# Patient Record
Sex: FEMALE | Race: ASIAN | Employment: FULL TIME | URBAN - METROPOLITAN AREA
[De-identification: names, ages, dates, MRNs, and addresses within clinical notes are randomized per-mention and may not be internally consistent; named-entity substitution may affect disease eponyms.]

---

## 2017-10-01 ENCOUNTER — TRANSFERRED RECORDS (OUTPATIENT)
Dept: HEALTH INFORMATION MANAGEMENT | Facility: CLINIC | Age: 37
End: 2017-10-01

## 2017-10-01 LAB — PAP SMEAR - HIM PATIENT REPORTED: NEGATIVE

## 2018-08-10 ENCOUNTER — OFFICE VISIT (OUTPATIENT)
Dept: FAMILY MEDICINE | Facility: CLINIC | Age: 38
End: 2018-08-10
Payer: COMMERCIAL

## 2018-08-10 VITALS
TEMPERATURE: 97.9 F | DIASTOLIC BLOOD PRESSURE: 80 MMHG | BODY MASS INDEX: 46.95 KG/M2 | WEIGHT: 265 LBS | HEIGHT: 63 IN | HEART RATE: 82 BPM | SYSTOLIC BLOOD PRESSURE: 120 MMHG

## 2018-08-10 DIAGNOSIS — N91.1 SECONDARY AMENORRHEA: Primary | ICD-10-CM

## 2018-08-10 LAB
ERYTHROCYTE [DISTWIDTH] IN BLOOD BY AUTOMATED COUNT: 14.6 % (ref 10–15)
ESTRADIOL SERPL-MCNC: 154 PG/ML
FSH SERPL-ACNC: 3.7 IU/L
HCT VFR BLD AUTO: 40.8 % (ref 35–47)
HGB BLD-MCNC: 12.4 G/DL (ref 11.7–15.7)
LH SERPL-ACNC: 2.7 IU/L
MCH RBC QN AUTO: 23.8 PG (ref 26.5–33)
MCHC RBC AUTO-ENTMCNC: 30.4 G/DL (ref 31.5–36.5)
MCV RBC AUTO: 78 FL (ref 78–100)
PLATELET # BLD AUTO: 338 10E9/L (ref 150–450)
PROGEST SERPL-MCNC: <0.2 NG/ML
PROLACTIN SERPL-MCNC: 6 UG/L (ref 3–27)
RBC # BLD AUTO: 5.22 10E12/L (ref 3.8–5.2)
WBC # BLD AUTO: 13.4 10E9/L (ref 4–11)

## 2018-08-10 PROCEDURE — 82670 ASSAY OF TOTAL ESTRADIOL: CPT | Performed by: INTERNAL MEDICINE

## 2018-08-10 PROCEDURE — 84403 ASSAY OF TOTAL TESTOSTERONE: CPT | Performed by: INTERNAL MEDICINE

## 2018-08-10 PROCEDURE — 85027 COMPLETE CBC AUTOMATED: CPT | Performed by: INTERNAL MEDICINE

## 2018-08-10 PROCEDURE — 83002 ASSAY OF GONADOTROPIN (LH): CPT | Performed by: INTERNAL MEDICINE

## 2018-08-10 PROCEDURE — 84146 ASSAY OF PROLACTIN: CPT | Performed by: INTERNAL MEDICINE

## 2018-08-10 PROCEDURE — 36415 COLL VENOUS BLD VENIPUNCTURE: CPT | Performed by: INTERNAL MEDICINE

## 2018-08-10 PROCEDURE — 99203 OFFICE O/P NEW LOW 30 MIN: CPT | Performed by: INTERNAL MEDICINE

## 2018-08-10 PROCEDURE — 84144 ASSAY OF PROGESTERONE: CPT | Performed by: INTERNAL MEDICINE

## 2018-08-10 PROCEDURE — 83001 ASSAY OF GONADOTROPIN (FSH): CPT | Performed by: INTERNAL MEDICINE

## 2018-08-10 PROCEDURE — 82627 DEHYDROEPIANDROSTERONE: CPT | Performed by: INTERNAL MEDICINE

## 2018-08-10 NOTE — MR AVS SNAPSHOT
"              After Visit Summary   8/10/2018    Puma Trent    MRN: [id number]           Patient Information     Date Of Birth          1980        Visit Information        Provider Department      8/10/2018 11:00 AM Sharla Bustillo MD [facility]        Today's Diagnoses     Missed period    -  1       Follow-ups after your visit        Follow-up notes from your care team     Return in about 1 week (around 2018) for pending lab results.      Who to contact     If you have questions or need follow up information about today's clinic visit or your schedule please contact [facility] directly at [phone].  Normal or non-critical lab and imaging results will be communicated to you by MyChart, letter or phone within 4 business days after the clinic has received the results. If you do not hear from us within 7 days, please contact the clinic through MyChart or phone. If you have a critical or abnormal lab result, we will notify you by phone as soon as possible.  Submit refill requests through [employer] or call your pharmacy and they will forward the refill request to us. Please allow 3 business days for your refill to be completed.          Additional Information About Your Visit        MyChart Information     Digital [employer] lets you send messages to your doctor, view your test results, renew your prescriptions, schedule appointments and more. To sign up, go to [email or web address] . Click on \"Log in\" on the left side of the screen, which will take you to the Welcome page. Then click on \"Sign up Now\" on the right side of the page.     You will be asked to enter the access code listed below, as well as some personal information. Please follow the directions to create your username and password.     Your access code is: [email or web address]  Expires: 2018 11:43 AM     Your access code will  in 90 days. If you need help or a new code, please call your [place] " clinic or [phone].        Care EveryWhere ID     This is your Care EveryWhere ID. This could be used by other organizations to access your [facility] medical records  [id number]         Blood Pressure from Last 3 Encounters:   No data found for BP    Weight from Last 3 Encounters:   No data found for Wt              We Performed the Following     CBC with platelets     DHEA sulfate     Estradiol     Follicle stimulating hormone     Lutropin     Progesterone     Prolactin     Testosterone, total        Primary Care Provider Office Phone # Fax #    [facility] [phone]        [facility] [address]        Equal Access to Services     KENNY AHUMADA : Hadii aad ku hadasho Soomaali, waaxda luqadaha, qaybta kaalmada adeegyada, leslie gordonin hayediln ann lee . So [facility] [phone].    ATENCIÓN: Si habla español, tiene a staton disposición servicios gratuitos de asistencia lingüística. Llame al [phone].    We comply with applicable federal civil rights laws and [place] laws. We do not discriminate on the basis of race, color, national origin, age, disability, sex, sexual orientation, or gender identity.            Thank you!     Thank you for choosing [facility]  for your care. Our goal is always to provide you with excellent care. Hearing back from our patients is one way we can continue to improve our services. Please take a few minutes to complete the written survey that you may receive in the mail after your visit with us. Thank you!             Your Updated Medication List - Protect others around you: Learn how to safely use, store and throw away your medicines at www.disposemymeds.org.      Notice  As of 8/10/2018 11:43 AM    You have not been prescribed any medications.

## 2018-08-10 NOTE — PROGRESS NOTES
"  SUBJECTIVE:   Puma Trent is a 38 year old female who presents to clinic today for the following health issues:    Amenorrhea.    LMP was about 4 months ago.  Prior to that she was having quite regular periods.  In 2013 she had an ovarian cyst which was removed and had missed a period around that time, but otherwise she has had regular menses.  She has taken pregnancy tests at home which have been negative.  She is not on any hormonal birth control, nor has she been on any recently.  Is on no prescription medications. Besides her weight she is quite healthy.  She denies headaches, vision, breast discharge, changes in skin.  He had her thyroid checked about 9 months ago at a yearly physical in [facility].    Moved to [place] from [facility] Nov 2017.  She works in [employer] for [employer].  Lives with  and kids.         Reviewed and updated as needed this visit by clinical staff  Allergies  Meds       Reviewed and updated as needed this visit by Provider  Meds         ROS:  Const, endo, gu reviewed,  otherwise negative unless noted above.       OBJECTIVE:     /80 (BP Location: Right arm, Patient Position: Chair, Cuff Size: Adult Large)  Pulse 82  Temp 97.9  F (36.6  C) (Tympanic)  Ht 5' 3\" (1.6 m)  Wt 265 lb (120.2 kg)  BMI 46.94 kg/m2  Body mass index is 46.94 kg/(m^2).    Gen: well appearing, pleasant obese woman, no distress  HEENT: PERRL, sclera nonicteric, MMM  Neck: supple, no LAD, no appreciable thyromegaly   Pulm: breathing comfortably, CTAB, no wheezes or rales  CV: RRR, normal S1 and S2, no murmurs  Abd: BS present, soft, nontender, nondistended        ASSESSMENT/PLAN:       1. Secondary amenorrhea  Strange to have no periods for 4 months when previously was having very regular periods.  Will check hormone levels today.  If unrevealing, consider pelvic ultrasound. TSH done annually in [facility] and has been normal, decided not to repeat today   - Prolactin  - Lutropin  - Follicle " stimulating hormone  - DHEA sulfate  - Testosterone, total  - Estradiol  - CBC with platelets  - Progesterone    2. BMI 45.0-49.9, adult (H)  Reports having testing for blood sugar in [place] and has always been normal.       F/U - pending lab results       Sharla Bustillo MD  [facility]

## 2018-08-13 LAB — DHEA-S SERPL-MCNC: 70 UG/DL (ref 35–430)

## 2018-08-14 ENCOUNTER — TELEPHONE (OUTPATIENT)
Dept: FAMILY MEDICINE | Facility: CLINIC | Age: 38
End: 2018-08-14

## 2018-08-14 DIAGNOSIS — D72.829 LEUKOCYTOSIS, UNSPECIFIED TYPE: ICD-10-CM

## 2018-08-14 DIAGNOSIS — N91.1 SECONDARY AMENORRHEA: Primary | ICD-10-CM

## 2018-08-14 LAB — TESTOST SERPL-MCNC: 20 NG/DL (ref 8–60)

## 2018-08-14 NOTE — TELEPHONE ENCOUNTER
Attempt to reach patient x 3.  Phone rings and then disconnects.  Will need to try again later.  Grazyna Garland RN - Triage  [facility]

## 2018-08-15 NOTE — TELEPHONE ENCOUNTER
Called number listed and phone disconnects without ringing. Sent P[employer]Noise message to patient to call clinic.    Elidia CrenshawRN BSN  [facility]  [phone]

## 2018-09-10 DIAGNOSIS — D72.829 LEUKOCYTOSIS, UNSPECIFIED TYPE: ICD-10-CM

## 2018-09-10 LAB
BASOPHILS # BLD AUTO: 0 10E9/L (ref 0–0.2)
BASOPHILS NFR BLD AUTO: 0.4 %
DIFFERENTIAL METHOD BLD: ABNORMAL
EOSINOPHIL # BLD AUTO: 0.2 10E9/L (ref 0–0.7)
EOSINOPHIL NFR BLD AUTO: 2 %
ERYTHROCYTE [DISTWIDTH] IN BLOOD BY AUTOMATED COUNT: 14.7 % (ref 10–15)
HCT VFR BLD AUTO: 37.2 % (ref 35–47)
HGB BLD-MCNC: 11.4 G/DL (ref 11.7–15.7)
LYMPHOCYTES # BLD AUTO: 3.3 10E9/L (ref 0.8–5.3)
LYMPHOCYTES NFR BLD AUTO: 30 %
MCH RBC QN AUTO: 24.1 PG (ref 26.5–33)
MCHC RBC AUTO-ENTMCNC: 30.6 G/DL (ref 31.5–36.5)
MCV RBC AUTO: 79 FL (ref 78–100)
MONOCYTES # BLD AUTO: 0.8 10E9/L (ref 0–1.3)
MONOCYTES NFR BLD AUTO: 6.7 %
NEUTROPHILS # BLD AUTO: 6.8 10E9/L (ref 1.6–8.3)
NEUTROPHILS NFR BLD AUTO: 60.9 %
PLATELET # BLD AUTO: 323 10E9/L (ref 150–450)
RBC # BLD AUTO: 4.73 10E12/L (ref 3.8–5.2)
WBC # BLD AUTO: 11.1 10E9/L (ref 4–11)

## 2018-09-10 PROCEDURE — 36415 COLL VENOUS BLD VENIPUNCTURE: CPT | Performed by: INTERNAL MEDICINE

## 2018-09-10 PROCEDURE — 85025 COMPLETE CBC W/AUTO DIFF WBC: CPT | Performed by: INTERNAL MEDICINE

## 2018-11-08 ENCOUNTER — OFFICE VISIT (OUTPATIENT)
Dept: FAMILY MEDICINE | Facility: CLINIC | Age: 38
End: 2018-11-08
Payer: COMMERCIAL

## 2018-11-08 VITALS
DIASTOLIC BLOOD PRESSURE: 70 MMHG | WEIGHT: 268 LBS | SYSTOLIC BLOOD PRESSURE: 112 MMHG | TEMPERATURE: 98.3 F | HEART RATE: 92 BPM | BODY MASS INDEX: 47.47 KG/M2

## 2018-11-08 DIAGNOSIS — Z11.4 SCREENING FOR HIV (HUMAN IMMUNODEFICIENCY VIRUS): Primary | ICD-10-CM

## 2018-11-08 DIAGNOSIS — N92.1 MENORRHAGIA WITH IRREGULAR CYCLE: ICD-10-CM

## 2018-11-08 DIAGNOSIS — E66.01 MORBID OBESITY (H): ICD-10-CM

## 2018-11-08 DIAGNOSIS — N92.6 ABNORMAL MENSTRUAL PERIODS: ICD-10-CM

## 2018-11-08 LAB
ERYTHROCYTE [DISTWIDTH] IN BLOOD BY AUTOMATED COUNT: 14.6 % (ref 10–15)
HCT VFR BLD AUTO: 38.2 % (ref 35–47)
HGB BLD-MCNC: 11.8 G/DL (ref 11.7–15.7)
MCH RBC QN AUTO: 23.9 PG (ref 26.5–33)
MCHC RBC AUTO-ENTMCNC: 30.9 G/DL (ref 31.5–36.5)
MCV RBC AUTO: 78 FL (ref 78–100)
PLATELET # BLD AUTO: 368 10E9/L (ref 150–450)
RBC # BLD AUTO: 4.93 10E12/L (ref 3.8–5.2)
WBC # BLD AUTO: 11.9 10E9/L (ref 4–11)

## 2018-11-08 PROCEDURE — 99214 OFFICE O/P EST MOD 30 MIN: CPT | Performed by: FAMILY MEDICINE

## 2018-11-08 PROCEDURE — 84443 ASSAY THYROID STIM HORMONE: CPT | Performed by: FAMILY MEDICINE

## 2018-11-08 PROCEDURE — 36415 COLL VENOUS BLD VENIPUNCTURE: CPT | Performed by: FAMILY MEDICINE

## 2018-11-08 PROCEDURE — 85027 COMPLETE CBC AUTOMATED: CPT | Performed by: FAMILY MEDICINE

## 2018-11-08 NOTE — MR AVS SNAPSHOT
After Visit Summary   11/8/2018    Puma Trent    MRN: [id number]           Patient Information     Date Of Birth          1980        Visit Information        Provider Department      11/8/2018 1:40 PM [facility]ron MD [facility]        Today's Diagnoses     Screening for HIV (human immunodeficiency virus)    -  1    Morbid obesity (H)        Abnormal menstrual periods        Menorrhagia with irregular cycle           Follow-ups after your visit        Follow-up notes from your care team     Return in about 4 weeks (around 12/6/2018) for if symptom does not get better.      Future tests that were ordered for you today     Open Future Orders        Priority Expected Expires Ordered    US Pelvic Complete w Transvaginal Routine  11/8/2019 11/8/2018            Who to contact     If you have questions or need follow up information about today's clinic visit or your schedule please contact [facility] directly at [phone].  Normal or non-critical lab and imaging results will be communicated to you by MyChart, letter or phone within 4 business days after the clinic has received the results. If you do not hear from us within 7 days, please contact the clinic through [employer] or phone. If you have a critical or abnormal lab result, we will notify you by phone as soon as possible.  Submit refill requests through [employer] or call your pharmacy and they will forward the refill request to us. Please allow 3 business days for your refill to be completed.          Additional Information About Your Visit        MyChart Information     [employer] gives you secure access to your electronic health record. If you see a primary care provider, you can also send messages to your care team and make appointments. If you have questions, please call your primary care clinic.  If you do not have a primary care provider, please call [phone] and they will assist you.        Care  EveryWhere ID     This is your Care EveryWhere ID. This could be used by other organizations to access your [facility] medical records  [id number]        Your Vitals Were     Pulse Temperature Last Period BMI (Body Mass Index)          92 98.3  F (36.8  C) (Tympanic) 11/04/2018 47.47 kg/m2         Blood Pressure from Last 3 Encounters:   11/08/18 112/70   08/10/18 120/80    Weight from Last 3 Encounters:   11/08/18 268 lb (121.6 kg)   08/10/18 265 lb (120.2 kg)              We Performed the Following     CBC with platelets     TSH        Primary Care Provider Office Phone # Fax #    Sharla Bustillo [phone]        [facility] [address]        Equal Access to Services     [facility] : Hadii aad ku hadasho Soomaali, waaxda luqadaha, qaybta kaalmada adesilviayagreg, leslie lee . So [facility] [phone].    ATENCIÓN: Si habla español, tiene a staton disposición servicios gratuitos de asistencia lingüística. Chuck al [phone].    We comply with applicable federal civil rights laws and [place] laws. We do not discriminate on the basis of race, color, national origin, age, disability, sex, sexual orientation, or gender identity.            Thank you!     Thank you for choosing [facility]  for your care. Our goal is always to provide you with excellent care. Hearing back from our patients is one way we can continue to improve our services. Please take a few minutes to complete the written survey that you may receive in the mail after your visit with us. Thank you!             Your Updated Medication List - Protect others around you: Learn how to safely use, store and throw away your medicines at www.disposemymeds.org.      Notice  As of 11/8/2018  2:24 PM    You have not been prescribed any medications.

## 2018-11-08 NOTE — PROGRESS NOTES
SUBJECTIVE:   Puma Trent is a 38 year old female who presents to clinic today for the following health issues:      Vaginal Bleeding (Dysmenorrhea)  Onset: lmp 4 days ago    Description:   Duration of bleeding episodes: 2 months  Frequency between periods:  Varies   Describe bleeding/flow:   Clots: YES  Number of pads/hour: 1 pad per 1-2 hours, soaked   Cramping: moderate    Accompanying Signs & Symptoms:  Weakness: YES  Lightheadedness: no     History:  Patient's last menstrual period was 11/04/2018.  Previous normal periods: no   Contraceptive use: NO  Possibility of Pregnancy: no   Abnormal PAP Smears: no    Therapies Tried and outcome: NONE  Patient came today to discuss irregular and heavy.'s.  Apparently she is morbidly obese.  She had her menstrual period started at normal age.  She always have regular periods  despite being overweight.  For the last 6 months her menstrual cycle is not regular.  She had one period 2 months ago which was very heavy.  After that she did not have any further menstrual bleeding.  3 days ago she again started her period.  She had extensive blood work done which was normal in terms of hormones.  Denies any abdominal discomfort.    Problem list and histories reviewed & adjusted, as indicated.  Additional history: as documented      Reviewed and updated as needed this visit by clinical staff  Tobacco  Allergies  Meds  Soc Hx      Reviewed and updated as needed this visit by Provider         ROS:  CONSTITUTIONAL: NEGATIVE for fever, chills, change in weight  ENT/MOUTH: NEGATIVE for ear, mouth and throat problems  RESP: NEGATIVE for significant cough or SOB  CV: NEGATIVE for chest pain, palpitations or peripheral edema    OBJECTIVE:                                                    /70  Pulse 92  Temp 98.3  F (36.8  C) (Tympanic)  Wt 268 lb (121.6 kg)  LMP 11/04/2018  BMI 47.47 kg/m2  Body mass index is 47.47 kg/(m^2).   GENERAL: healthy, alert, well nourished,  well hydrated, no distress  NECK: no tenderness, no adenopathy, no asymmetry, no masses, no stiffness; thyroid- normal to palpation  RESP: lungs clear to auscultation - no rales, no rhonchi, no wheezes  CV: regular rates and rhythm, normal S1 S2, no S3 or S4 and no murmur, no click or rub -  ABDOMEN: soft, no tenderness, no  hepatosplenomegaly, no masses, normal bowel sounds         ASSESSMENT/PLAN:                                                        ICD-10-CM    1. Screening for HIV (human immunodeficiency virus) Z11.4    2. Morbid obesity (H) E66.01    3. Abnormal menstrual periods N92.6 US Pelvic Complete w Transvaginal     TSH     CBC with platelets   4. Menorrhagia with irregular cycle N92.1 US Pelvic Complete w Transvaginal     TSH     CBC with platelets     Has abnormal menstrual with menorrhagia with irregular cycles.  She has blood work done which was normal in terms of hormones.  I would suggest getting an ultrasound of pelvis to get further evaluation.  Once ultrasound is done we will follow-up on that.  Meanwhile I will get thyroid and complete blood count to make sure they are normal.  We discussed long-term if her periods are not regular and there continue to be heavy, at times birth control OCP might be beneficial.  Other etiologies were discussed with the patient.  Sometimes underlying polycystic ovarian disease  can cause irregular periods.  We will follow-up with the patient once ultrasound report is reviewed.  Warning signs were discussed with the patient    Nba Barron MD  [facility]

## 2018-11-09 LAB — TSH SERPL DL<=0.005 MIU/L-ACNC: 1.24 MU/L (ref 0.4–4)

## 2019-10-11 ENCOUNTER — HOSPITAL ENCOUNTER (OUTPATIENT)
Dept: ULTRASOUND IMAGING | Facility: CLINIC | Age: 39
Discharge: HOME | End: 2019-10-11
Attending: FAMILY MEDICINE | Admitting: FAMILY MEDICINE
Payer: COMMERCIAL

## 2019-10-11 DIAGNOSIS — N92.6 ABNORMAL MENSTRUAL PERIODS: ICD-10-CM

## 2019-10-11 DIAGNOSIS — N92.1 MENORRHAGIA WITH IRREGULAR CYCLE: ICD-10-CM

## 2019-10-11 PROCEDURE — 76830 TRANSVAGINAL US NON-OB: CPT

## 2019-10-14 ENCOUNTER — OFFICE VISIT (OUTPATIENT)
Dept: FAMILY MEDICINE | Facility: CLINIC | Age: 39
End: 2019-10-14
Payer: COMMERCIAL

## 2019-10-14 ENCOUNTER — E-VISIT (OUTPATIENT)
Dept: FAMILY MEDICINE | Facility: CLINIC | Age: 39
End: 2019-10-14

## 2019-10-14 VITALS
HEIGHT: 63 IN | HEART RATE: 116 BPM | TEMPERATURE: 98.3 F | SYSTOLIC BLOOD PRESSURE: 118 MMHG | BODY MASS INDEX: 48.9 KG/M2 | WEIGHT: 276 LBS | OXYGEN SATURATION: 99 % | DIASTOLIC BLOOD PRESSURE: 70 MMHG

## 2019-10-14 DIAGNOSIS — N92.1 MENORRHAGIA WITH IRREGULAR CYCLE: Primary | ICD-10-CM

## 2019-10-14 DIAGNOSIS — N89.8 VAGINAL DISCHARGE: Primary | ICD-10-CM

## 2019-10-14 LAB
ERYTHROCYTE [DISTWIDTH] IN BLOOD BY AUTOMATED COUNT: 16.3 % (ref 10–15)
HCT VFR BLD AUTO: 37.3 % (ref 35–47)
HGB BLD-MCNC: 11.2 G/DL (ref 11.7–15.7)
MCH RBC QN AUTO: 23.5 PG (ref 26.5–33)
MCHC RBC AUTO-ENTMCNC: 30 G/DL (ref 31.5–36.5)
MCV RBC AUTO: 78 FL (ref 78–100)
PLATELET # BLD AUTO: 366 10E9/L (ref 150–450)
RBC # BLD AUTO: 4.77 10E12/L (ref 3.8–5.2)
WBC # BLD AUTO: 10.3 10E9/L (ref 4–11)

## 2019-10-14 PROCEDURE — 36415 COLL VENOUS BLD VENIPUNCTURE: CPT | Performed by: FAMILY MEDICINE

## 2019-10-14 PROCEDURE — 99214 OFFICE O/P EST MOD 30 MIN: CPT | Performed by: FAMILY MEDICINE

## 2019-10-14 PROCEDURE — 83550 IRON BINDING TEST: CPT | Performed by: FAMILY MEDICINE

## 2019-10-14 PROCEDURE — 85027 COMPLETE CBC AUTOMATED: CPT | Performed by: FAMILY MEDICINE

## 2019-10-14 PROCEDURE — 83540 ASSAY OF IRON: CPT | Performed by: FAMILY MEDICINE

## 2019-10-14 PROCEDURE — 80048 BASIC METABOLIC PNL TOTAL CA: CPT | Performed by: FAMILY MEDICINE

## 2019-10-14 PROCEDURE — 82728 ASSAY OF FERRITIN: CPT | Performed by: FAMILY MEDICINE

## 2019-10-14 PROCEDURE — 84443 ASSAY THYROID STIM HORMONE: CPT | Performed by: FAMILY MEDICINE

## 2019-10-14 ASSESSMENT — MIFFLIN-ST. JEOR: SCORE: 1891.06

## 2019-10-14 NOTE — PROGRESS NOTES
Subjective     Puma Trent is a 39 year old female who presents to clinic today for the following health issues:    HPI     Patient comes in to follow-up on pelvic ultrasound that was recommended about a year ago.  She was experiencing heavy periods at that time.  She never got the ultrasound done as the cycles improved.  She tells that in the last few months she has noticed heavy vaginal bleeding again.  She is currently having heavy bleeding for the last few days.  Reports of abdominal cramping.  Pelvic ultrasound result reviewed.  It shows a necrotic submucosal fibroid  Versus thick-walled myometrial cyst in the posterior uterine body.  Marked thickening of the endometrial lining noted.  Results discussed with the patient in detail.      Patient Active Problem List   Diagnosis     BMI 45.0-49.9, adult (H)     Past Surgical History:   Procedure Laterality Date      SECTION  2008     LAPAROSCOPIC CYSTECTOMY OVARIAN (BENIGN)  2013       Social History     Tobacco Use     Smoking status: Never Smoker     Smokeless tobacco: Never Used   Substance Use Topics     Alcohol use: No     Family History   Problem Relation Age of Onset     Hyperlipidemia Mother      Hypertension Mother      No Known Problems Father      No Known Problems Sister      No Known Problems Brother      No Known Problems Maternal Grandmother      No Known Problems Maternal Grandfather      No Known Problems Paternal Grandmother      No Known Problems Other          Current Outpatient Medications   Medication Sig Dispense Refill     ferrous sulfate (FEROSUL) 325 (65 Fe) MG tablet Take 325 mg by mouth daily (with breakfast)       Allergies   Allergen Reactions     Sulfa Drugs Hives     Fainting and drop in blood pressure          Reviewed and updated as needed this visit by Provider         Review of Systems   ROS COMP: CONSTITUTIONAL: NEGATIVE for fever, chills, change in weight  ENT/MOUTH: NEGATIVE for ear, mouth and throat  "problems  RESP: NEGATIVE for significant cough or SOB  CV: NEGATIVE for chest pain, palpitations or peripheral edema      Objective    /70   Pulse 116   Temp 98.3  F (36.8  C) (Tympanic)   Ht 1.6 m (5' 3\")   Wt 125.2 kg (276 lb)   LMP 09/29/2019 (Exact Date)   SpO2 99%   BMI 48.89 kg/m    Body mass index is 48.89 kg/m .  Physical Exam   GENERAL: healthy, alert and no distress  NECK: no adenopathy, no asymmetry, masses, or scars and thyroid normal to palpation  RESP: lungs clear to auscultation - no rales, rhonchi or wheezes  CV: regular rate and rhythm, normal S1 S2, no S3 or S4, no murmur, click or rub, no peripheral edema and peripheral pulses strong  ABDOMEN: soft, nontender, no hepatosplenomegaly, no masses and bowel sounds normal  MS: no gross musculoskeletal defects noted, no edema          Results for orders placed or performed in visit on 10/14/19   CBC with platelets   Result Value Ref Range    WBC 10.3 4.0 - 11.0 10e9/L    RBC Count 4.77 3.8 - 5.2 10e12/L    Hemoglobin 11.2 (L) 11.7 - 15.7 g/dL    Hematocrit 37.3 35.0 - 47.0 %    MCV 78 78 - 100 fl    MCH 23.5 (L) 26.5 - 33.0 pg    MCHC 30.0 (L) 31.5 - 36.5 g/dL    RDW 16.3 (H) 10.0 - 15.0 %    Platelet Count 366 150 - 450 10e9/L   Ferritin   Result Value Ref Range    Ferritin 13 12 - 150 ng/mL   Iron and iron binding capacity   Result Value Ref Range    Iron 20 (L) 35 - 180 ug/dL    Iron Binding Cap 349 240 - 430 ug/dL    Iron Saturation Index 6 (L) 15 - 46 %   Basic metabolic panel   Result Value Ref Range    Sodium 140 133 - 144 mmol/L    Potassium 4.2 3.4 - 5.3 mmol/L    Chloride 107 94 - 109 mmol/L    Carbon Dioxide 24 20 - 32 mmol/L    Anion Gap 9 3 - 14 mmol/L    Glucose 144 (H) 70 - 99 mg/dL    Urea Nitrogen 6 (L) 7 - 30 mg/dL    Creatinine 0.43 (L) 0.52 - 1.04 mg/dL    GFR Estimate >90 >60 mL/min/[1.73_m2]    GFR Estimate If Black >90 >60 mL/min/[1.73_m2]    Calcium 8.2 (L) 8.5 - 10.1 mg/dL   TSH   Result Value Ref Range    TSH 1.28 " "0.40 - 4.00 mU/L       Assessment & Plan     1. Menorrhagia with irregular cycle  Blood work shows borderline low hemoglobin with low iron levels.  Recommending to start iron supplementation.  Recheck labs in 1 to 2 months.  Patient is experiencing heavy vaginal bleeding today due to which endometrial biopsy cannot be performed.  She is instructed to call us as soon as the vaginal bleeding decreases so elevated biopsy could be obtained.  She is given a referral to OB/GYN, to contact them for the procedure as I am going to be out of office in the next few days.  Patient verbalized understanding and agreement to the plan.  - CBC with platelets  - Ferritin  - Iron and iron binding capacity  - Basic metabolic panel  - TSH  - OB/GYN REFERRAL     BMI:   Estimated body mass index is 48.89 kg/m  as calculated from the following:    Height as of this encounter: 1.6 m (5' 3\").    Weight as of this encounter: 125.2 kg (276 lb).   Weight management plan: Discussed healthy diet and exercise guidelines        Daniella Chapman MD  [facility]      "

## 2019-10-15 LAB
ANION GAP SERPL CALCULATED.3IONS-SCNC: 9 MMOL/L (ref 3–14)
BUN SERPL-MCNC: 6 MG/DL (ref 7–30)
CALCIUM SERPL-MCNC: 8.2 MG/DL (ref 8.5–10.1)
CHLORIDE SERPL-SCNC: 107 MMOL/L (ref 94–109)
CO2 SERPL-SCNC: 24 MMOL/L (ref 20–32)
CREAT SERPL-MCNC: 0.43 MG/DL (ref 0.52–1.04)
FERRITIN SERPL-MCNC: 13 NG/ML (ref 12–150)
GFR SERPL CREATININE-BSD FRML MDRD: >90 ML/MIN/{1.73_M2}
GLUCOSE SERPL-MCNC: 144 MG/DL (ref 70–99)
IRON SATN MFR SERPL: 6 % (ref 15–46)
IRON SERPL-MCNC: 20 UG/DL (ref 35–180)
POTASSIUM SERPL-SCNC: 4.2 MMOL/L (ref 3.4–5.3)
SODIUM SERPL-SCNC: 140 MMOL/L (ref 133–144)
TIBC SERPL-MCNC: 349 UG/DL (ref 240–430)
TSH SERPL DL<=0.005 MIU/L-ACNC: 1.28 MU/L (ref 0.4–4)

## 2019-10-22 ENCOUNTER — OFFICE VISIT (OUTPATIENT)
Dept: OBGYN | Facility: CLINIC | Age: 39
End: 2019-10-22
Payer: COMMERCIAL

## 2019-10-22 VITALS — SYSTOLIC BLOOD PRESSURE: 122 MMHG | WEIGHT: 276 LBS | BODY MASS INDEX: 48.89 KG/M2 | DIASTOLIC BLOOD PRESSURE: 76 MMHG

## 2019-10-22 DIAGNOSIS — Z12.4 SCREENING FOR CERVICAL CANCER: Primary | ICD-10-CM

## 2019-10-22 DIAGNOSIS — D25.0 FIBROIDS, SUBMUCOSAL: ICD-10-CM

## 2019-10-22 DIAGNOSIS — N93.8 DUB (DYSFUNCTIONAL UTERINE BLEEDING): ICD-10-CM

## 2019-10-22 PROCEDURE — 87624 HPV HI-RISK TYP POOLED RSLT: CPT | Performed by: OBSTETRICS & GYNECOLOGY

## 2019-10-22 PROCEDURE — 88305 TISSUE EXAM BY PATHOLOGIST: CPT | Performed by: OBSTETRICS & GYNECOLOGY

## 2019-10-22 PROCEDURE — G0145 SCR C/V CYTO,THINLAYER,RESCR: HCPCS | Performed by: OBSTETRICS & GYNECOLOGY

## 2019-10-22 PROCEDURE — 88305 TISSUE EXAM BY PATHOLOGIST: CPT | Mod: 26 | Performed by: OBSTETRICS & GYNECOLOGY

## 2019-10-22 PROCEDURE — 58100 BIOPSY OF UTERUS LINING: CPT | Performed by: OBSTETRICS & GYNECOLOGY

## 2019-10-22 PROCEDURE — 99203 OFFICE O/P NEW LOW 30 MIN: CPT | Mod: 25 | Performed by: OBSTETRICS & GYNECOLOGY

## 2019-10-22 RX ORDER — FERROUS SULFATE 325(65) MG
325 TABLET ORAL
COMMUNITY
End: 2019-12-26

## 2019-10-22 ASSESSMENT — ANXIETY QUESTIONNAIRES
GAD7 TOTAL SCORE: 3
7. FEELING AFRAID AS IF SOMETHING AWFUL MIGHT HAPPEN: NOT AT ALL
6. BECOMING EASILY ANNOYED OR IRRITABLE: MORE THAN HALF THE DAYS
IF YOU CHECKED OFF ANY PROBLEMS ON THIS QUESTIONNAIRE, HOW DIFFICULT HAVE THESE PROBLEMS MADE IT FOR YOU TO DO YOUR WORK, TAKE CARE OF THINGS AT HOME, OR GET ALONG WITH OTHER PEOPLE: NOT DIFFICULT AT ALL
5. BEING SO RESTLESS THAT IT IS HARD TO SIT STILL: NOT AT ALL
1. FEELING NERVOUS, ANXIOUS, OR ON EDGE: NOT AT ALL
3. WORRYING TOO MUCH ABOUT DIFFERENT THINGS: SEVERAL DAYS
2. NOT BEING ABLE TO STOP OR CONTROL WORRYING: NOT AT ALL

## 2019-10-22 ASSESSMENT — PATIENT HEALTH QUESTIONNAIRE - PHQ9
SUM OF ALL RESPONSES TO PHQ QUESTIONS 1-9: 5
5. POOR APPETITE OR OVEREATING: NOT AT ALL

## 2019-10-22 NOTE — PROGRESS NOTES
SUBJECTIVE:                                                   Puma Trent is a 39 year old female who presents to clinic today for the following health issue(s):  Patient presents with:  Abnormal Uterine Bleeding: possible EMB    HPI:  Patient referred due to abnormal uterine bleeding  Patient had one c section  Morbid obesity, wt 276  Saw her pcp for abnormal bleeding in 2018.   She had a pelvic us this month showing thick uterine lining and a possible submucous fibroid versus myometrial cyst  Recent hgb 11.2   TSH normal  Prior laproscopic ovarian cystectomy years ago  One child, not using contraception  Patient does not have diabetes, no family history of gyn cancer or diabetes  Patient had one prolonged bleeding spell a years ago, then went 5 months with no period, then had a couple normal periods and in recent weeks had heavy prolonged bleeding with clots  Has mild anemia noted recently  Not on any hormones  No history of abnormal paps      Patient's last menstrual period was 2019 (exact date)..     Patient is sexually active, No obstetric history on file..  Using none for contraception.    reports that she has never smoked. She has never used smokeless tobacco.    STD testing offered?  Declined    Health maintenance updated:  yes    Today's PHQ-2 Score:   PHQ-2 (  [employer]) 10/14/2019   Q1: Little interest or pleasure in doing things 0   Q2: Feeling down, depressed or hopeless 0   PHQ-2 Score 0     Today's PHQ-9 Score:   PHQ-9 SCORE 10/22/2019   PHQ-9 Total Score 5     Today's LINN-7 Score:   LINN-7 SCORE 10/22/2019   Total Score 3       Problem list and histories reviewed & adjusted, as indicated.  Additional history: as documented.    Patient Active Problem List   Diagnosis     BMI 45.0-49.9, adult (H)     Past Surgical History:   Procedure Laterality Date      SECTION  2008     LAPAROSCOPIC CYSTECTOMY OVARIAN (BENIGN)  2013      Social History     Tobacco Use     Smoking  status: Never Smoker     Smokeless tobacco: Never Used   Substance Use Topics     Alcohol use: No      Problem (# of Occurrences) Relation (Name,Age of Onset)    Hyperlipidemia (1) Mother    Hypertension (1) Mother    No Known Problems (7) Father, Sister, Brother, Maternal Grandmother, Maternal Grandfather, Paternal Grandmother, Other            Current Outpatient Medications   Medication Sig     ferrous sulfate (FEROSUL) 325 (65 Fe) MG tablet Take 325 mg by mouth daily (with breakfast)     No current facility-administered medications for this visit.      Allergies   Allergen Reactions     Sulfa Drugs Hives     Fainting and drop in blood pressure        ROS:  12 point review of systems negative other than symptoms noted below.  Genitourinary: Irregular Menses    OBJECTIVE:     /76   Wt 125.2 kg (276 lb)   LMP 09/29/2019 (Exact Date)   Breastfeeding? No   BMI 48.89 kg/m    Body mass index is 48.89 kg/m .    Exam:  Constitutional:  Appearance: Well nourished, well developed alert, in no acute distress  Chest:  Respiratory Effort:  Breathing unlabored. Clear to auscultation bilaterally.   Psychiatric:  Mentation appears normal and affect normal/bright.  Pelvic Exam:  External Genitalia:     Normal appearance for age, no discharge present, no tenderness present, no inflammatory lesions present, color normal  Vagina:     Normal vaginal vault without central or paravaginal defects, no discharge present, no inflammatory lesions present, no masses present  Bladder:     Nontender to palpation  Urethra:   Urethral Body:  Urethra palpation normal, urethra structural support normal   Urethral Meatus:  No erythema or lesions present  Cervix:     Appearance healthy, no lesions present, nontender to palpation, no bleeding present  Uterus:     Uterus: firm, normal sized and nontender, midplane in position.   Adnexa:     No adnexal tenderness present, no adnexal masses present  Perineum:     Perineum within normal limits,  no evidence of trauma, no rashes or skin lesions present  Anus:     Anus within normal limits, no hemorrhoids present  Inguinal Lymph Nodes:     No lymphadenopathy present  Pubic Hair:     Normal pubic hair distribution for age  Genitalia and Groin:     No rashes present, no lesions present, no areas of discoloration, no masses present       In-Clinic Test Results:  No results found for this or any previous visit (from the past 24 hour(s)).    ASSESSMENT/PLAN:                                                        ICD-10-CM    1. Screening for cervical cancer Z12.4 Pap imaged thin layer screen with HPV - recommended age 30 - 65     HPV High Risk Types DNA Cervical   2. DUB (dysfunctional uterine bleeding) N93.8 Surgical pathology exam     ENDOMETRIAL BIOPSY W/O CERVICAL DILATION   3. Fibroids, submucosal D25.0        Recent ultrasound showed 1cm possible submucous fibroid which appeared necrotic to radiology on ultrasound and she has very thick lining    We did pap and hpv today, and emb  Patient has poor pain tolerance so office procedures would be difficult for her  If path is benign, we will need to schedule D & C, hysteroscopy, myosure to check her lining better  Her obesity increases risk for uterine cancer and hyperplasia  Uterine cavity measured 10 cm in depth today  Could consider placing mirena IUD in OR after D & C  Ablation would be an option   We will call her when path comes back  Discussed that losing weight would be better for her health and reduce risks of uterine cancer  Additional 20 minutes spent today discussing her history, and concerns regarding DYSFUNCTIONAL UTERINE BLEEDING and fibroids, beyond time need to do procedure today, more than 50% of visit  Exams are very difficult for her    Schedule f/u appt with me next week to discuss next steps after we get path back  Likely will need D & C, hysteroscopy and myosure at minimum    Camelia Darby MD  [facility]OMEN  [facility]  INDICATIONS:                                                    Is a pregnancy test required: No.  Was a consent obtained?  Yes    Having endometrial biopsy for menorrhagia    Today's PHQ-2 Score:   PHQ-2 ( 1999 [employer]) 10/14/2019   Q1: Little interest or pleasure in doing things 0   Q2: Feeling down, depressed or hopeless 0   PHQ-2 Score 0       PROCEDURE;                                                      A speculum was placed in the vagina and cervix prepped with betadine. A tenaculum was attached to the cervix. A small plastic 5 mm Pipelle syringe curette was inserted into the cervical canal. The uterus was sounded to 10 cm's. A vigorous four quadrant biopsy was performed, removing amount scant  of tissue. The speculum was removed. This tissue was placed in Formalin and sent to pathology.    The patient tolerated the procedure  poorly and she reported there was  cramping.      POST PROCEDURE;                                                      There  was no cramping at the time of discharge. She  experienced moderate discomfort during the procedure. There were no complications. Patient was discharged in stable condition.    Patient advised to call the clinic if severe pelvic pain, fever or heavy bleeding.    Camelia Darby MD

## 2019-10-23 ASSESSMENT — ANXIETY QUESTIONNAIRES: GAD7 TOTAL SCORE: 3

## 2019-10-24 LAB
COPATH REPORT: NORMAL
COPATH REPORT: NORMAL
PAP: NORMAL

## 2019-10-28 LAB
FINAL DIAGNOSIS: NORMAL
HPV HR 12 DNA CVX QL NAA+PROBE: NEGATIVE
HPV16 DNA SPEC QL NAA+PROBE: NEGATIVE
HPV18 DNA SPEC QL NAA+PROBE: NEGATIVE
SPECIMEN DESCRIPTION: NORMAL
SPECIMEN SOURCE CVX/VAG CYTO: NORMAL

## 2019-10-29 ENCOUNTER — OFFICE VISIT (OUTPATIENT)
Dept: OBGYN | Facility: CLINIC | Age: 39
End: 2019-10-29
Payer: COMMERCIAL

## 2019-10-29 ENCOUNTER — ANCILLARY PROCEDURE (OUTPATIENT)
Dept: MAMMOGRAPHY | Facility: CLINIC | Age: 39
End: 2019-10-29
Payer: COMMERCIAL

## 2019-10-29 VITALS
BODY MASS INDEX: 48.9 KG/M2 | SYSTOLIC BLOOD PRESSURE: 118 MMHG | WEIGHT: 276 LBS | DIASTOLIC BLOOD PRESSURE: 70 MMHG | HEIGHT: 63 IN

## 2019-10-29 DIAGNOSIS — Z12.31 VISIT FOR SCREENING MAMMOGRAM: ICD-10-CM

## 2019-10-29 DIAGNOSIS — D25.0 FIBROIDS, SUBMUCOSAL: ICD-10-CM

## 2019-10-29 DIAGNOSIS — N93.8 DUB (DYSFUNCTIONAL UTERINE BLEEDING): Primary | ICD-10-CM

## 2019-10-29 PROCEDURE — 77067 SCR MAMMO BI INCL CAD: CPT | Mod: TC

## 2019-10-29 PROCEDURE — 99214 OFFICE O/P EST MOD 30 MIN: CPT | Performed by: OBSTETRICS & GYNECOLOGY

## 2019-10-29 PROCEDURE — 77063 BREAST TOMOSYNTHESIS BI: CPT | Mod: TC

## 2019-10-29 ASSESSMENT — MIFFLIN-ST. JEOR: SCORE: 1891.06

## 2019-10-29 NOTE — PROGRESS NOTES
SUBJECTIVE:                                                   Puma Trent is a 39 year old female who presents to clinic today for the following health issue(s):  Patient presents with:  Follow Up: patient had an EMB for abnormal bleeding, here to discuss next steps      HPI:  Patient here to discuss next steps to deal with her heavy irregular bleeding  Doesn't tolerate office procedures well   Had ultrasound showing very thick uterine lining and possible necrotic submucous fibroid  Has decreased iron stores and mild anemia  Morbid obesity increases risk of endometrial neoplasia  emb showed disordered proliferative endometrium  One child  Uterus is deep, sounded 10 cm so we aren't going to try ablation for first choice since cavity might be too large for instrument and risk of endometrial neoplasia in future might be hard to diagnose        Patient's last menstrual period was 2019 (exact date)..     Patient is sexually active, .  Using none for contraception.    reports that she has never smoked. She has never used smokeless tobacco.    STD testing offered?  Declined    Health maintenance updated:  yes    Today's PHQ-2 Score:   PHQ-2 (  [employer]) 10/14/2019   Q1: Little interest or pleasure in doing things 0   Q2: Feeling down, depressed or hopeless 0   PHQ-2 Score 0     Today's PHQ-9 Score:   PHQ-9 SCORE 10/22/2019   PHQ-9 Total Score 5     Today's LINN-7 Score:   LINN-7 SCORE 10/22/2019   Total Score 3       Problem list and histories reviewed & adjusted, as indicated.  Additional history: as documented.    Patient Active Problem List   Diagnosis     BMI 45.0-49.9, adult (H)     Past Surgical History:   Procedure Laterality Date      SECTION  2008     LAPAROSCOPIC CYSTECTOMY OVARIAN (BENIGN)  2013      Social History     Tobacco Use     Smoking status: Never Smoker     Smokeless tobacco: Never Used   Substance Use Topics     Alcohol use: No      Problem (# of Occurrences)  "Relation (Name,Age of Onset)    Hyperlipidemia (1) Mother    Hypertension (1) Mother    No Known Problems (7) Father, Sister, Brother, Maternal Grandmother, Maternal Grandfather, Paternal Grandmother, Other            Current Outpatient Medications   Medication Sig     ferrous sulfate (FEROSUL) 325 (65 Fe) MG tablet Take 325 mg by mouth daily (with breakfast)     No current facility-administered medications for this visit.      Allergies   Allergen Reactions     Sulfa Drugs Hives     Fainting and drop in blood pressure        ROS:  12 point review of systems negative other than symptoms noted below.    OBJECTIVE:     /70   Ht 1.6 m (5' 3\")   Wt 125.2 kg (276 lb)   LMP 09/29/2019 (Exact Date)   BMI 48.89 kg/m    Body mass index is 48.89 kg/m .    Exam:  Constitutional:  Appearance: Well nourished, well developed alert, in no acute distress  Chest:  Respiratory Effort:  Breathing unlabored. Clear to auscultation bilaterally.   Psychiatric:  Mentation appears normal and affect normal/bright.     In-Clinic Test Results:  No results found for this or any previous visit (from the past 24 hour(s)).    ASSESSMENT/PLAN:                                                        ICD-10-CM    1. DUB (dysfunctional uterine bleeding) N93.8 [facility]-Operative Worksheet GYN       There are no Patient Instructions on file for this visit.    Patient is taking iron  Plan D & C, hysteroscopy with myosure and plan to insert mirena IUD in OR  Will need preop with pcp    Discussed that we need a more complete sampling of uterine lining before we do IUD   I don't think she would handle IUD insertion in office    Informed consent obtained  Risks and benefits discussed    Marcial pictures of planned procedure    Face to face 25 minute, greater than 50% counceling    Camelia Darby MD  [facility]  "

## 2019-10-30 ENCOUNTER — TELEPHONE (OUTPATIENT)
Dept: OBGYN | Facility: CLINIC | Age: 39
End: 2019-10-30

## 2019-10-30 ENCOUNTER — PREP FOR PROCEDURE (OUTPATIENT)
Dept: OBGYN | Facility: CLINIC | Age: 39
End: 2019-10-30

## 2019-10-30 DIAGNOSIS — D25.0 FIBROIDS, SUBMUCOSAL: ICD-10-CM

## 2019-10-30 DIAGNOSIS — N92.0 MENORRHAGIA: ICD-10-CM

## 2019-10-30 DIAGNOSIS — N93.9 ABNORMAL UTERINE BLEEDING: Primary | ICD-10-CM

## 2019-10-30 NOTE — TELEPHONE ENCOUNTER
SENT FOR 2nd SIGN    Hiwot Leonard  Surgery Scheduler    Procedure name(s) - multi select D & C, hysteroscopy with myosure, insertion mirena IUD   Reason for procedure abnormal uterine bleeding, possible submucous fibroid, menorrhagia   Surgeon: Mehnaz   Is this a multi surgeon case? No   Laterality N/A   Request for additional equipment Other (see comments)   Comment: None   Anesthesia General   Initiate Pre-op orders for above procedure: Yes, as ordered in Epic   Comment: Additional orders noted there also   Location of Case: [facility]   PA Assistant: No   Operating room  requested: No   Urgency of Surgery: Routine   Comment: nov or dec    Surgeon Procedure Time (incision to closure) in minutes (per procedure as applicable) 45   Note:  Surgical Case Time Needed (in minutes)   Patient Class (for admit prior to surgery, specify number of days in comments): Same day (hospital outpatient)   Why can t this outpatient surgery be done at the [facility] or [facility]? na   H&P To Be Completed By: PCP   Vendor Needed? Yes   Specify vendor: [email or web address]

## 2019-11-12 ENCOUNTER — HOSPITAL ENCOUNTER (EMERGENCY)
Facility: CLINIC | Age: 39
Discharge: HOME | End: 2019-11-12
Attending: EMERGENCY MEDICINE | Admitting: EMERGENCY MEDICINE
Payer: COMMERCIAL

## 2019-11-12 VITALS
BODY MASS INDEX: 47.12 KG/M2 | HEART RATE: 85 BPM | WEIGHT: 276 LBS | OXYGEN SATURATION: 99 % | RESPIRATION RATE: 24 BRPM | SYSTOLIC BLOOD PRESSURE: 120 MMHG | TEMPERATURE: 99 F | DIASTOLIC BLOOD PRESSURE: 92 MMHG | HEIGHT: 64 IN

## 2019-11-12 DIAGNOSIS — K29.00 ACUTE GASTRITIS WITHOUT HEMORRHAGE, UNSPECIFIED GASTRITIS TYPE: ICD-10-CM

## 2019-11-12 DIAGNOSIS — R07.9 CHEST PAIN, UNSPECIFIED TYPE: ICD-10-CM

## 2019-11-12 PROCEDURE — 93005 ELECTROCARDIOGRAM TRACING: CPT

## 2019-11-12 PROCEDURE — 25000125 ZZHC RX 250: Performed by: EMERGENCY MEDICINE

## 2019-11-12 PROCEDURE — 99283 EMERGENCY DEPT VISIT LOW MDM: CPT

## 2019-11-12 PROCEDURE — 25000132 ZZH RX MED GY IP 250 OP 250 PS 637: Performed by: EMERGENCY MEDICINE

## 2019-11-12 RX ADMIN — LIDOCAINE HYDROCHLORIDE 30 ML: 20 SOLUTION ORAL; TOPICAL at 20:04

## 2019-11-12 ASSESSMENT — ENCOUNTER SYMPTOMS
CHEST TIGHTNESS: 1
ABDOMINAL PAIN: 0

## 2019-11-12 ASSESSMENT — MIFFLIN-ST. JEOR: SCORE: 1906.93

## 2019-11-12 NOTE — ED AVS SNAPSHOT
Emergency Department  [address] [place] [id number]  Phone:  [phone]  Fax:  [phone]                                    Puma Trent   MRN: [id number]    Department:   Emergency Department   Date of Visit:  11/12/2019           After Visit Summary Signature Page    I have received my discharge instructions, and my questions have been answered. I have discussed any challenges I see with this plan with the nurse or doctor.    ..........................................................................................................................................  Patient/Patient Representative Signature      ..........................................................................................................................................  Patient Representative Print Name and Relationship to Patient    ..................................................               ................................................  Date                                   Time    ..........................................................................................................................................  Reviewed by Signature/Title    ...................................................              ..............................................  Date                                               Time          22EPIC Rev 08/18

## 2019-11-13 LAB — INTERPRETATION ECG - MUSE: NORMAL

## 2019-11-13 NOTE — ED TRIAGE NOTES
Patient went to urgent care for a sore throat. She was given a dose of Dexamathasone and then began experiencing chest pain.

## 2019-11-13 NOTE — ED PROVIDER NOTES
"  History     Chief Complaint:  Chest Tightness     HPI   Puma Trent is a 39 year old female who presents with chest tightness. The patient reports that this morning she woke up gasping for air and her mouth and throat felt sore. She notes that she had a gagging sensation and her uvula was elongated. The patient gurgled salt water and pushed warm fluids throughout the day but felt that her uvula felt \"funny\". She then took a nap and again after 20 minutes woke up gasping for air. The patient was seen at [facility] in [place] for her symptoms where she had a negative rapid strep and was then given a dose of Decadron. After 10-15 minutes of consuming this, the patient started to develop chest rightness and pain with belching and yawning. The patient states that she was made ambulate around the clinic, felt some relief after 3-4 times of belching, but the tightness returned. The patient had a reassuring EKG obtained and her blood glucose was 133. She was then recommended to be evaluated in the emergency department for persistent chest tightness that she describes as feeling like a rock throughout her chest. Here, the patient endorses that she has not had any improvement of her symptoms. She denies abdominal pain, ill exposures, or change of pregnancy. The patient still has her gallbladder.     Cardiac Risk Factors:  The patient denies a history of diabetes, hypertension, high cholesterol, known cardiac disease or current smoking.    PE/DVT Risk Factors:  The patient denies a personal or family history of PE, DVT, or other clotting disorders. The patient denies any recent travel, surgery, or other prolonged immobilization. The patient denies tobacco use or hormone use.    Allergies:  Sulfa drugs; hives      Medications:    Ferosul     Past Medical History:    Menorrhagia     Past Surgical History:    c section   cystectomy ovarian   Hysteroscopy dilation and curettage    Family History:    Mother: " "hypertension, hyperlipidemia     Social History:  The patient was accompanied to the ED by .  Smoking Status: Never Smoker  Smokeless Tobacco: Never Used  Alcohol Use: Negative   Drug Use: Negative  PCP: Sharla Bustillo   Marital Status:        Review of Systems   Constitutional:        Belching   yawning   HENT:        Mouth and throat sore  Uvula elongated   Respiratory: Positive for chest tightness.    Cardiovascular: Positive for chest pain.   Gastrointestinal: Negative for abdominal pain.   All other systems reviewed and are negative.    Physical Exam     Patient Vitals for the past 24 hrs:   BP Temp Temp src Pulse Heart Rate Resp SpO2 Height Weight   11/12/19 2044 (!) 120/92 -- -- 85 89 24 -- -- --   11/12/19 2007 -- -- -- -- 92 17 -- -- --   11/12/19 1915 (!) 141/103 99  F (37.2  C) Oral 90 90 16 99 % 1.626 m (5' 4\") 125.2 kg (276 lb)      Physical Exam    Physical Exam   General:  Sitting on bed with  at bedside.   HENT:  No obvious trauma to head. Posterior oropharynx without erythema, uvula is midline and no soft palate petechiae. No swelling beneath tongue.  No significant uvular edema.  Right Ear:  External ear normal. Tympanic membrane is without erythema or bulging.   Left Ear:  External ear normal. Tympanic membrane is without erythema or bulging.   Nose:  Nose normal.   Eyes:  Conjunctivae and EOM are normal. Pupils are equal, round, and reactive.   Neck: Normal range of motion. Neck supple. No tracheal deviation present.   CV:  Normal heart sounds. No murmur heard.  Pulm/Chest: Effort normal and breath sounds normal.   Abd: Soft. No distension. There is no tenderness. There is no rigidity, no rebound and no guarding. Negative Moctezuma's sign.   M/S: Normal range of motion.   Neuro: Alert. GCS 15.  Skin: Skin is warm and dry. No rash noted. Not diaphoretic.   Psych: Normal mood and affect. Behavior is normal.     Emergency Department Course     ECG:  ECG taken at 1912, ECG " read at 1920  Normal sinus rhythm  Normal ECG  Rate 92 bpm. CO interval 164 ms. QRS duration 78 ms. QT/QTc 350/432 ms. P-R-T axes 49 4 30.    Interventions:  2004 GI Cocktail 30 mL PO     Emergency Department Course:    1912 EKG obtained as noted above.     1941 Nursing notes and vitals reviewed. I performed an exam of the patient as documented above.     2029 I rechecked the patient.  She is feeling nearly 100% better.  She desires to go home.  Does not desire labs or additional testing.  Prior to discharge, I personally reviewed the results with the patient and all related questions were answered. The patient verbalized understanding and is amenable to plan.     Impression & Plan      Medical Decision Making:  Puma Trent is a very pleasant 39 year old female who presents for evaluation of chest tightness and belching.  I considered a broad differential diagnosis for this patient including gastritis, GERD, cholecystitis, choledocholithiasis, biliary colic, pancreatitis, colonic or small bowel pathology, vascular etiologies including aneurysm, ulcer.  The patient has had a sore throat since this morning.  She was seen at the [facility] in [place] by Dr. Arias and had a negative rapid strep test.  She was diagnosed with a viral pharyngitis and given a prescription for amoxicillin.  The patient has not picked up this prescription yet.  She was given a one-time dose of Decadron while at the clinic.  This is when her GI upset began.     Signs and symptoms here are consistent with gastritis.  Patient experienced complete relief here with GI cocktail. Given extent of relief with GI cocktail would not do a further workup including labs and/or ultrasound/CT for etiologies such as pancreatitis or cholecystitis.  She had no focal right upper quadrant pain to suggest either of these.  There are no signs of any serious etiologies as mentioned above or intraabdominal catastrophes.  I highly doubt this is a PE or acute  coronary syndrome and as she is tender in the abdomen would not do any further workup for this as she also has no risk factors for these.  A screening EKG is unremarkable.  Doubt perforated ulcer at this point.  Will refer back to primary and do scheduled medication for stomach acid reduction x 14 days.  Consider endoscopy if further symptoms despite this.  Gastritis precautions given for home.      The treatment plan was discussed with the patient and they expressed understanding of this plan and consented to the plan.  In addition, the patient will return to the emergency department if their symptoms persist, worsen, if new symptoms arise or if there is any concern as other pathology may be present that is not evident at this time. They also understand the importance of close follow up in the clinic and if unable to do so will return to the emergency department for a reevaluation. All questions were answered.    Diagnosis:    ICD-10-CM    1. Chest pain, unspecified type R07.9    2. Acute gastritis without hemorrhage, unspecified gastritis type K29.00      Disposition:   The patient is discharged to home.     Discharge Medications:  No discharge medications.    Scribe Disclosure:  I, Orla Severson, am serving as a scribe at 7:29 PM on 11/12/2019 to document services personally performed by Carlos Kurtz DO based on my observations and the provider's statements to me.    EMERGENCY DEPARTMENT       Carlos Kurtz DO  11/12/19 2055

## 2019-12-02 ENCOUNTER — MYC MEDICAL ADVICE (OUTPATIENT)
Dept: OBGYN | Facility: CLINIC | Age: 39
End: 2019-12-02

## 2019-12-02 PROBLEM — D25.0 FIBROIDS, SUBMUCOSAL: Status: ACTIVE | Noted: 2019-12-02

## 2019-12-02 PROBLEM — N92.0 MENORRHAGIA: Status: ACTIVE | Noted: 2019-12-02

## 2019-12-02 PROBLEM — N93.9 ABNORMAL UTERINE BLEEDING: Status: ACTIVE | Noted: 2019-12-02

## 2019-12-02 NOTE — TELEPHONE ENCOUNTER
Routing to provide to advise on procedure plan per [employer] message.    Tita Nj RN on 12/2/2019 at 12:47 PM

## 2019-12-02 NOTE — TELEPHONE ENCOUNTER
Hiwot left patient a voice mail to call back on Oct 30.   I sent in surgery request earlier that same day to Hiwot.   Doubtful we will get it scheduled in December because slots are full.   Most likely early jan.  Check with Hiwot  I forwarded message to both of you

## 2019-12-02 NOTE — TELEPHONE ENCOUNTER
Patient calling wanting to schedule for December. 12/12 or 12/19    Type of surgery: D & C, hysteroscopy with myosure, insertion mirena IUD  Location of surgery: [facility]  Date and time of surgery: 12/12/19 8:50-9:20  Surgeon: Mehnaz  Pre-Op Appt Date: with PCP  Post-Op Appt Date:    Packet sent out: Yes  Mailed 12/2/19  Pre-cert/Authorization completed:    Date: 12/2/19

## 2019-12-09 NOTE — TELEPHONE ENCOUNTER
Per Lakisha at [facility] this PA has been approved [id number]    Pt called and LVM stating she has insurance questions.  Also wondering if can do this with an epidural vs general anesthesia  Spoke with Dr Darby who stated no she can not do with an epidural.    LVM for pt to MARIANNA Leonard  Surgery Scheduler

## 2019-12-11 ENCOUNTER — OFFICE VISIT (OUTPATIENT)
Dept: FAMILY MEDICINE | Facility: CLINIC | Age: 39
End: 2019-12-11
Payer: COMMERCIAL

## 2019-12-11 VITALS
WEIGHT: 275.4 LBS | SYSTOLIC BLOOD PRESSURE: 100 MMHG | HEART RATE: 88 BPM | HEIGHT: 64 IN | TEMPERATURE: 97.6 F | DIASTOLIC BLOOD PRESSURE: 70 MMHG | OXYGEN SATURATION: 97 % | BODY MASS INDEX: 47.02 KG/M2

## 2019-12-11 DIAGNOSIS — N93.9 ABNORMAL UTERINE BLEEDING: ICD-10-CM

## 2019-12-11 DIAGNOSIS — R73.9 ELEVATED BLOOD SUGAR: ICD-10-CM

## 2019-12-11 DIAGNOSIS — Z01.818 PREOP GENERAL PHYSICAL EXAM: Primary | ICD-10-CM

## 2019-12-11 DIAGNOSIS — Z13.220 SCREENING FOR HYPERLIPIDEMIA: ICD-10-CM

## 2019-12-11 PROCEDURE — 82947 ASSAY GLUCOSE BLOOD QUANT: CPT | Performed by: INTERNAL MEDICINE

## 2019-12-11 PROCEDURE — 80061 LIPID PANEL: CPT | Performed by: INTERNAL MEDICINE

## 2019-12-11 PROCEDURE — 36415 COLL VENOUS BLD VENIPUNCTURE: CPT | Performed by: INTERNAL MEDICINE

## 2019-12-11 PROCEDURE — 99214 OFFICE O/P EST MOD 30 MIN: CPT | Performed by: INTERNAL MEDICINE

## 2019-12-11 ASSESSMENT — MIFFLIN-ST. JEOR: SCORE: 1904.21

## 2019-12-11 NOTE — PROGRESS NOTES
[facility] [place]  [address] [address]  [phone]  Dept: [phone]    PRE-OP EVALUATION:  Today's date: 2019    Puma Trent (: 1980) presents for pre-operative evaluation assessment as requested by Dr. Camelia Darby.  She requires evaluation and anesthesia risk assessment prior to undergoing surgery/procedure for treatment of menorrhagia .    Proposed Surgery/ Procedure: Hysteroscopy dilation and curettage with myosure, insertation of IUD (Mirena)  Date of Surgery/ Procedure: 2019  Time of Surgery/ Procedure: 8:50 AM  Hospital/Surgical Facility: [facility]  Primary Physician: Sharla Bustillo  Type of Anesthesia Anticipated: General    Patient has a Health Care Directive or Living Will:  NO    1. NO - Do you have a history of heart attack, stroke, stent, bypass or surgery on an artery in the head, neck, heart or legs?  2. NO - Do you ever have any pain or discomfort in your chest?  3. NO - Do you have a history of  Heart Failure?  4. YES - ARE YOUR TROUBLED BY SHORTNESS OF BREATH WHEN WALKING ON THE LEVEL, UP A SLIGHT HILL OR AT NIGHT? Chronic, deconditioning   5. NO - Do you currently have a cold, bronchitis or other respiratory infection?  6. NO - Do you have a cough, shortness of breath or wheezing?  7. NO - Do you sometimes get pains in the calves of your legs when you walk?  8. NO - Do you or anyone in your family have previous history of blood clots?  9. NO - Do you or does anyone in your family have a serious bleeding problem such as prolonged bleeding following surgeries or cuts?  10. YES - HAVE YOU EVER HAD PROBLEMS WITH ANEMIA OR BEEN TOLD TO TAKE IRON PILLS? Yes, due to heavy bleeding   11. NO - Have you had any abnormal blood loss such as black, tarry or bloody stools, or abnormal vaginal bleeding?  12. NO - Have you ever had a blood transfusion?  13. NO - Have you or any of your relatives ever had problems  with anesthesia?  14. NO - Do you have sleep apnea, excessive snoring or daytime drowsiness?  15. NO - Do you have any prosthetic heart valves?  16. NO - Do you have prosthetic joints?  17. NO - Is there any chance that you may be pregnant?      HPI:     HPI related to upcoming procedure: Puma has heavy bleeding. Fibroids, and thickened endometrium. She is undergoing D&C.     No other medical problems besides her obesity. No recent illnesses.       MEDICAL HISTORY:     Patient Active Problem List    Diagnosis Date Noted     Abnormal uterine bleeding 2019     Priority: Medium     Added automatically from request for surgery [id number]       Fibroids, submucosal 2019     Priority: Medium     Added automatically from request for surgery [id number]       Menorrhagia 2019     Priority: Medium     Added automatically from request for surgery [id number]       BMI 45.0-49.9, adult (H) 08/10/2018     Priority: Medium      Past Medical History:   Diagnosis Date     BMI 45.0-49.9, adult (H)      Menorrhagia      Past Surgical History:   Procedure Laterality Date      SECTION  2008     HEAD & NECK SURGERY  Tonsils removal ()     LAPAROSCOPIC CYSTECTOMY OVARIAN (BENIGN)  2013     Current Outpatient Medications   Medication Sig Dispense Refill     ferrous sulfate (FEROSUL) 325 (65 Fe) MG tablet Take 325 mg by mouth daily (with breakfast)       OTC products: None, except as noted above    Allergies   Allergen Reactions     Sulfa Drugs Hives     Fainting and drop in blood pressure       Latex Allergy: NO    Social History     Tobacco Use     Smoking status: Never Smoker     Smokeless tobacco: Never Used   Substance Use Topics     Alcohol use: No     History   Drug Use No       REVIEW OF SYSTEMS:   Const, HEENT, cv, pulm, gi, gu, heme reviewed,  otherwise negative unless noted above.      EXAM:   /70 (BP Location: Left arm, Patient Position: Chair, Cuff Size: Adult Large)   Pulse 88   Temp  "97.6  F (36.4  C) (Tympanic)   Ht 1.626 m (5' 4\")   Wt 124.9 kg (275 lb 6.4 oz)   LMP 11/30/2019 (Exact Date)   SpO2 97%   BMI 47.27 kg/m    Gen: well appearing, pleasant woman, no distress  HEENT: PERRL, sclera nonicteric, MMM  Neck: supple, no LAD  Pulm: breathing comfortably, CTAB, no wheezes or rales  CV: RRR, normal S1 and S2, no murmurs  Abd: BS present, soft, nontender, nondistended  Ext: 2+ distal pulses, no LE edema      DIAGNOSTICS:   No labs or EKG indicated    Recent Labs   Lab Test 10/14/19  1508 11/08/18  1413   HGB 11.2* 11.8    368     --    POTASSIUM 4.2  --    CR 0.43*  --         IMPRESSION:   Reason for surgery/procedure: D&C for DUB  Diagnosis/reason for consult: pre-op eval     The proposed surgical procedure is considered LOW risk.    REVISED CARDIAC RISK INDEX  The patient has the following serious cardiovascular risks for perioperative complications such as (MI, PE, VFib and 3  AV Block):  No serious cardiac risks  INTERPRETATION: 0 risks: Class I (very low risk - 0.4% complication rate)    The patient has the following additional risks for perioperative complications:  Morbid obesity      ICD-10-CM    1. Preop general physical exam Z01.818    2. Abnormal uterine bleeding N93.9    3. BMI 45.0-49.9, adult (H) Z68.42    4. Screening for hyperlipidemia Z13.220 Lipid panel reflex to direct LDL Fasting   5. Elevated blood sugar R73.9 Glucose       RECOMMENDATIONS:           APPROVAL GIVEN to proceed with proposed procedure, without further diagnostic evaluation       Signed Electronically by: Sharla Bustillo MD    Copy of this evaluation report is provided to requesting physician.    [facility] Preop Guidelines    Revised Cardiac Risk Index  "

## 2019-12-12 ENCOUNTER — ANESTHESIA (OUTPATIENT)
Dept: SURGERY | Facility: CLINIC | Age: 39
End: 2019-12-12
Payer: COMMERCIAL

## 2019-12-12 ENCOUNTER — HOSPITAL ENCOUNTER (OUTPATIENT)
Facility: CLINIC | Age: 39
Discharge: HOME | End: 2019-12-12
Attending: OBSTETRICS & GYNECOLOGY | Admitting: OBSTETRICS & GYNECOLOGY
Payer: COMMERCIAL

## 2019-12-12 ENCOUNTER — ANESTHESIA EVENT (OUTPATIENT)
Dept: SURGERY | Facility: CLINIC | Age: 39
End: 2019-12-12
Payer: COMMERCIAL

## 2019-12-12 VITALS
WEIGHT: 278.4 LBS | OXYGEN SATURATION: 95 % | TEMPERATURE: 97.1 F | RESPIRATION RATE: 16 BRPM | HEART RATE: 92 BPM | DIASTOLIC BLOOD PRESSURE: 60 MMHG | SYSTOLIC BLOOD PRESSURE: 113 MMHG | BODY MASS INDEX: 47.53 KG/M2 | HEIGHT: 64 IN

## 2019-12-12 DIAGNOSIS — N92.0 MENORRHAGIA: ICD-10-CM

## 2019-12-12 DIAGNOSIS — N93.9 ABNORMAL UTERINE BLEEDING: ICD-10-CM

## 2019-12-12 DIAGNOSIS — D25.0 FIBROIDS, SUBMUCOSAL: ICD-10-CM

## 2019-12-12 LAB
B-HCG SERPL-ACNC: <1 IU/L (ref 0–5)
CHOLEST SERPL-MCNC: 178 MG/DL
GLUCOSE SERPL-MCNC: 113 MG/DL (ref 70–99)
HDLC SERPL-MCNC: 39 MG/DL
LDLC SERPL CALC-MCNC: 112 MG/DL
NONHDLC SERPL-MCNC: 139 MG/DL
TRIGL SERPL-MCNC: 135 MG/DL

## 2019-12-12 PROCEDURE — 25000125 ZZHC RX 250: Performed by: ANESTHESIOLOGY

## 2019-12-12 PROCEDURE — 25000125 ZZHC RX 250: Performed by: NURSE ANESTHETIST, CERTIFIED REGISTERED

## 2019-12-12 PROCEDURE — 88305 TISSUE EXAM BY PATHOLOGIST: CPT | Performed by: OBSTETRICS & GYNECOLOGY

## 2019-12-12 PROCEDURE — 25000132 ZZH RX MED GY IP 250 OP 250 PS 637: Performed by: INTERNAL MEDICINE

## 2019-12-12 PROCEDURE — 84702 CHORIONIC GONADOTROPIN TEST: CPT | Performed by: OBSTETRICS & GYNECOLOGY

## 2019-12-12 PROCEDURE — 27210794 ZZH OR GENERAL SUPPLY STERILE: Performed by: OBSTETRICS & GYNECOLOGY

## 2019-12-12 PROCEDURE — 36000056 ZZH SURGERY LEVEL 3 1ST 30 MIN: Performed by: OBSTETRICS & GYNECOLOGY

## 2019-12-12 PROCEDURE — 25000128 H RX IP 250 OP 636: Performed by: OBSTETRICS & GYNECOLOGY

## 2019-12-12 PROCEDURE — 58300 INSERT INTRAUTERINE DEVICE: CPT | Performed by: OBSTETRICS & GYNECOLOGY

## 2019-12-12 PROCEDURE — 36000058 ZZH SURGERY LEVEL 3 EA 15 ADDTL MIN: Performed by: OBSTETRICS & GYNECOLOGY

## 2019-12-12 PROCEDURE — 25000128 H RX IP 250 OP 636: Performed by: ANESTHESIOLOGY

## 2019-12-12 PROCEDURE — 40000170 ZZH STATISTIC PRE-PROCEDURE ASSESSMENT II: Performed by: OBSTETRICS & GYNECOLOGY

## 2019-12-12 PROCEDURE — 25000128 H RX IP 250 OP 636: Performed by: NURSE ANESTHETIST, CERTIFIED REGISTERED

## 2019-12-12 PROCEDURE — 71000012 ZZH RECOVERY PHASE 1 LEVEL 1 FIRST HR: Performed by: OBSTETRICS & GYNECOLOGY

## 2019-12-12 PROCEDURE — 88305 TISSUE EXAM BY PATHOLOGIST: CPT | Mod: 26 | Performed by: OBSTETRICS & GYNECOLOGY

## 2019-12-12 PROCEDURE — 25800030 ZZH RX IP 258 OP 636: Performed by: NURSE ANESTHETIST, CERTIFIED REGISTERED

## 2019-12-12 PROCEDURE — 71000027 ZZH RECOVERY PHASE 2 EACH 15 MINS: Performed by: OBSTETRICS & GYNECOLOGY

## 2019-12-12 PROCEDURE — 25000132 ZZH RX MED GY IP 250 OP 250 PS 637: Performed by: OBSTETRICS & GYNECOLOGY

## 2019-12-12 PROCEDURE — 25800025 ZZH RX 258: Performed by: OBSTETRICS & GYNECOLOGY

## 2019-12-12 PROCEDURE — 37000008 ZZH ANESTHESIA TECHNICAL FEE, 1ST 30 MIN: Performed by: OBSTETRICS & GYNECOLOGY

## 2019-12-12 PROCEDURE — 25800030 ZZH RX IP 258 OP 636: Performed by: ANESTHESIOLOGY

## 2019-12-12 PROCEDURE — 25000566 ZZH SEVOFLURANE, EA 15 MIN: Performed by: OBSTETRICS & GYNECOLOGY

## 2019-12-12 PROCEDURE — 37000009 ZZH ANESTHESIA TECHNICAL FEE, EACH ADDTL 15 MIN: Performed by: OBSTETRICS & GYNECOLOGY

## 2019-12-12 PROCEDURE — 36415 COLL VENOUS BLD VENIPUNCTURE: CPT | Performed by: OBSTETRICS & GYNECOLOGY

## 2019-12-12 PROCEDURE — 25000125 ZZHC RX 250: Performed by: OBSTETRICS & GYNECOLOGY

## 2019-12-12 PROCEDURE — 71000013 ZZH RECOVERY PHASE 1 LEVEL 1 EA ADDTL HR: Performed by: OBSTETRICS & GYNECOLOGY

## 2019-12-12 PROCEDURE — 58558 HYSTEROSCOPY BIOPSY: CPT | Performed by: OBSTETRICS & GYNECOLOGY

## 2019-12-12 RX ORDER — ONDANSETRON 2 MG/ML
4 INJECTION INTRAMUSCULAR; INTRAVENOUS EVERY 30 MIN PRN
Status: DISCONTINUED | OUTPATIENT
Start: 2019-12-12 | End: 2019-12-12 | Stop reason: HOSPADM

## 2019-12-12 RX ORDER — MAGNESIUM HYDROXIDE 1200 MG/15ML
LIQUID ORAL PRN
Status: DISCONTINUED | OUTPATIENT
Start: 2019-12-12 | End: 2019-12-12 | Stop reason: HOSPADM

## 2019-12-12 RX ORDER — HYDROXYZINE HYDROCHLORIDE 25 MG/1
50 TABLET, FILM COATED ORAL 3 TIMES DAILY PRN
Status: DISCONTINUED | OUTPATIENT
Start: 2019-12-12 | End: 2019-12-12

## 2019-12-12 RX ORDER — NALOXONE HYDROCHLORIDE 0.4 MG/ML
.1-.4 INJECTION, SOLUTION INTRAMUSCULAR; INTRAVENOUS; SUBCUTANEOUS
Status: DISCONTINUED | OUTPATIENT
Start: 2019-12-12 | End: 2019-12-12 | Stop reason: HOSPADM

## 2019-12-12 RX ORDER — SODIUM CHLORIDE, SODIUM LACTATE, POTASSIUM CHLORIDE, CALCIUM CHLORIDE 600; 310; 30; 20 MG/100ML; MG/100ML; MG/100ML; MG/100ML
INJECTION, SOLUTION INTRAVENOUS CONTINUOUS
Status: DISCONTINUED | OUTPATIENT
Start: 2019-12-12 | End: 2019-12-12 | Stop reason: HOSPADM

## 2019-12-12 RX ORDER — PROPOFOL 10 MG/ML
INJECTION, EMULSION INTRAVENOUS CONTINUOUS PRN
Status: DISCONTINUED | OUTPATIENT
Start: 2019-12-12 | End: 2019-12-12

## 2019-12-12 RX ORDER — FENTANYL CITRATE 50 UG/ML
25-50 INJECTION, SOLUTION INTRAMUSCULAR; INTRAVENOUS
Status: DISCONTINUED | OUTPATIENT
Start: 2019-12-12 | End: 2019-12-12 | Stop reason: HOSPADM

## 2019-12-12 RX ORDER — HYDROCODONE BITARTRATE AND ACETAMINOPHEN 5; 325 MG/1; MG/1
1 TABLET ORAL EVERY 6 HOURS PRN
Qty: 12 TABLET | Refills: 0 | Status: SHIPPED | OUTPATIENT
Start: 2019-12-12 | End: 2019-12-26

## 2019-12-12 RX ORDER — PROPOFOL 10 MG/ML
INJECTION, EMULSION INTRAVENOUS PRN
Status: DISCONTINUED | OUTPATIENT
Start: 2019-12-12 | End: 2019-12-12

## 2019-12-12 RX ORDER — LIDOCAINE HYDROCHLORIDE 20 MG/ML
INJECTION, SOLUTION INFILTRATION; PERINEURAL PRN
Status: DISCONTINUED | OUTPATIENT
Start: 2019-12-12 | End: 2019-12-12

## 2019-12-12 RX ORDER — HYDROMORPHONE HYDROCHLORIDE 1 MG/ML
.3-.5 INJECTION, SOLUTION INTRAMUSCULAR; INTRAVENOUS; SUBCUTANEOUS EVERY 10 MIN PRN
Status: DISCONTINUED | OUTPATIENT
Start: 2019-12-12 | End: 2019-12-12 | Stop reason: HOSPADM

## 2019-12-12 RX ORDER — OXYCODONE HYDROCHLORIDE 5 MG/1
5 TABLET ORAL EVERY 4 HOURS PRN
Status: DISCONTINUED | OUTPATIENT
Start: 2019-12-12 | End: 2019-12-12 | Stop reason: HOSPADM

## 2019-12-12 RX ORDER — DEXAMETHASONE SODIUM PHOSPHATE 4 MG/ML
INJECTION, SOLUTION INTRA-ARTICULAR; INTRALESIONAL; INTRAMUSCULAR; INTRAVENOUS; SOFT TISSUE PRN
Status: DISCONTINUED | OUTPATIENT
Start: 2019-12-12 | End: 2019-12-12

## 2019-12-12 RX ORDER — ONDANSETRON 4 MG/1
4 TABLET, ORALLY DISINTEGRATING ORAL EVERY 30 MIN PRN
Status: DISCONTINUED | OUTPATIENT
Start: 2019-12-12 | End: 2019-12-12 | Stop reason: HOSPADM

## 2019-12-12 RX ORDER — BUPIVACAINE HYDROCHLORIDE 2.5 MG/ML
INJECTION, SOLUTION INFILTRATION; PERINEURAL PRN
Status: DISCONTINUED | OUTPATIENT
Start: 2019-12-12 | End: 2019-12-12 | Stop reason: HOSPADM

## 2019-12-12 RX ORDER — ACETAMINOPHEN 325 MG/1
975 TABLET ORAL ONCE
Status: COMPLETED | OUTPATIENT
Start: 2019-12-12 | End: 2019-12-12

## 2019-12-12 RX ORDER — MEPERIDINE HYDROCHLORIDE 25 MG/ML
12.5 INJECTION INTRAMUSCULAR; INTRAVENOUS; SUBCUTANEOUS
Status: DISCONTINUED | OUTPATIENT
Start: 2019-12-12 | End: 2019-12-12 | Stop reason: HOSPADM

## 2019-12-12 RX ORDER — ONDANSETRON 2 MG/ML
INJECTION INTRAMUSCULAR; INTRAVENOUS PRN
Status: DISCONTINUED | OUTPATIENT
Start: 2019-12-12 | End: 2019-12-12

## 2019-12-12 RX ORDER — FENTANYL CITRATE 50 UG/ML
INJECTION, SOLUTION INTRAMUSCULAR; INTRAVENOUS PRN
Status: DISCONTINUED | OUTPATIENT
Start: 2019-12-12 | End: 2019-12-12

## 2019-12-12 RX ORDER — HYDROXYZINE HYDROCHLORIDE 25 MG/1
50 TABLET, FILM COATED ORAL
Status: COMPLETED | OUTPATIENT
Start: 2019-12-12 | End: 2019-12-12

## 2019-12-12 RX ADMIN — PHENYLEPHRINE HYDROCHLORIDE 100 MCG: 10 INJECTION INTRAVENOUS at 09:44

## 2019-12-12 RX ADMIN — PHENYLEPHRINE HYDROCHLORIDE 100 MCG: 10 INJECTION INTRAVENOUS at 09:39

## 2019-12-12 RX ADMIN — PROPOFOL 200 MG: 10 INJECTION, EMULSION INTRAVENOUS at 09:03

## 2019-12-12 RX ADMIN — LIDOCAINE HYDROCHLORIDE 1 ML: 10 INJECTION, SOLUTION EPIDURAL; INFILTRATION; INTRACAUDAL; PERINEURAL at 07:53

## 2019-12-12 RX ADMIN — DEXMEDETOMIDINE HYDROCHLORIDE 12 MCG: 100 INJECTION, SOLUTION INTRAVENOUS at 09:23

## 2019-12-12 RX ADMIN — FENTANYL CITRATE 100 MCG: 50 INJECTION, SOLUTION INTRAMUSCULAR; INTRAVENOUS at 09:03

## 2019-12-12 RX ADMIN — ONDANSETRON 4 MG: 2 INJECTION INTRAMUSCULAR; INTRAVENOUS at 10:46

## 2019-12-12 RX ADMIN — LIDOCAINE HYDROCHLORIDE 100 MG: 20 INJECTION, SOLUTION INFILTRATION; PERINEURAL at 09:03

## 2019-12-12 RX ADMIN — SUCCINYLCHOLINE CHLORIDE 160 MG: 20 INJECTION, SOLUTION INTRAMUSCULAR; INTRAVENOUS; PARENTERAL at 09:04

## 2019-12-12 RX ADMIN — ACETAMINOPHEN 975 MG: 325 TABLET, FILM COATED ORAL at 07:37

## 2019-12-12 RX ADMIN — PROPOFOL 50 MCG/KG/MIN: 10 INJECTION, EMULSION INTRAVENOUS at 09:10

## 2019-12-12 RX ADMIN — HYDROXYZINE HYDROCHLORIDE 50 MG: 50 TABLET, FILM COATED ORAL at 08:02

## 2019-12-12 RX ADMIN — SODIUM CHLORIDE, POTASSIUM CHLORIDE, SODIUM LACTATE AND CALCIUM CHLORIDE: 600; 310; 30; 20 INJECTION, SOLUTION INTRAVENOUS at 08:59

## 2019-12-12 RX ADMIN — ONDANSETRON 4 MG: 2 INJECTION INTRAMUSCULAR; INTRAVENOUS at 09:15

## 2019-12-12 RX ADMIN — DEXAMETHASONE SODIUM PHOSPHATE 4 MG: 4 INJECTION, SOLUTION INTRA-ARTICULAR; INTRALESIONAL; INTRAMUSCULAR; INTRAVENOUS; SOFT TISSUE at 09:15

## 2019-12-12 RX ADMIN — MIDAZOLAM 2 MG: 1 INJECTION INTRAMUSCULAR; INTRAVENOUS at 09:00

## 2019-12-12 SDOH — HEALTH STABILITY: MENTAL HEALTH: HOW OFTEN DO YOU HAVE A DRINK CONTAINING ALCOHOL?: NEVER

## 2019-12-12 ASSESSMENT — MIFFLIN-ST. JEOR: SCORE: 1917.81

## 2019-12-12 NOTE — ANESTHESIA PREPROCEDURE EVALUATION
Anesthesia Pre-Procedure Evaluation    Patient: Puma Trent   MRN: [id number] : 1980          Preoperative Diagnosis: Abnormal uterine bleeding [N93.9]  Fibroids, submucosal [D25.0]  Menorrhagia [N92.0]    Procedure(s):  HYSTEROSCOPY DILATION AND CURETTAGE WITH MYOSURE  INSERTION, INTRAUTERINE DEVICE (MIRENA)    Past Medical History:   Diagnosis Date     BMI 45.0-49.9, adult (H)      Complication of anesthesia      Menorrhagia      Past Surgical History:   Procedure Laterality Date      SECTION  2008     HEAD & NECK SURGERY  Tonsils removal ()     LAPAROSCOPIC CYSTECTOMY OVARIAN (BENIGN)  2013       Anesthesia Evaluation     .             ROS/MED HX    ENT/Pulmonary:       Neurologic:       Cardiovascular:         METS/Exercise Tolerance:     Hematologic:         Musculoskeletal:         GI/Hepatic:         Renal/Genitourinary:         Endo: Comment: Abnormal uterine bleeding/menorrhagia    (+) Obesity, .      Psychiatric:         Infectious Disease:         Malignancy:         Other:                          Physical Exam  Normal systems: dental    Airway   Mallampati: I  TM distance: >3 FB  Neck ROM: full    Dental     Cardiovascular   Rhythm and rate: regular      Pulmonary             Lab Results   Component Value Date    WBC 10.3 10/14/2019    HGB 11.2 (L) 10/14/2019    HCT 37.3 10/14/2019     10/14/2019     10/14/2019    POTASSIUM 4.2 10/14/2019    CHLORIDE 107 10/14/2019    CO2 24 10/14/2019    BUN 6 (L) 10/14/2019    CR 0.43 (L) 10/14/2019     (H) 10/14/2019    KORI 8.2 (L) 10/14/2019    TSH 1.28 10/14/2019       Preop Vitals  BP Readings from Last 3 Encounters:   19 (!) 146/93   19 100/70   19 (!) 120/92    Pulse Readings from Last 3 Encounters:   19 88   19 85   10/14/19 116      Resp Readings from Last 3 Encounters:   19 24   19 24    SpO2 Readings from Last 3 Encounters:   19 97%   19 97%   19  "99%      Temp Readings from Last 1 Encounters:   12/12/19 36  C (96.8  F) (Temporal)    Ht Readings from Last 1 Encounters:   12/12/19 1.626 m (5' 4\")      Wt Readings from Last 1 Encounters:   12/12/19 126.3 kg (278 lb 6.4 oz)    Estimated body mass index is 47.79 kg/m  as calculated from the following:    Height as of this encounter: 1.626 m (5' 4\").    Weight as of this encounter: 126.3 kg (278 lb 6.4 oz).       Anesthesia Plan      History & Physical Review  History and physical reviewed and following examination; no interval change.    ASA Status:  2 .    NPO Status:  > 8 hours    Plan for ETT and General with Propofol induction. Maintenance will be Balanced.    PONV prophylaxis:  Ondansetron (or other 5HT-3) and Dexamethasone or Solumedrol  Zofran, decadron  Propofol infusion  toradol if surgeon in agreement      Postoperative Care  Postoperative pain management:  Oral pain medications and IV analgesics.      Consents  Anesthetic plan, risks, benefits and alternatives discussed with:  Patient..                 Sterling Choi MD  "

## 2019-12-12 NOTE — BRIEF OP NOTE
[facility]    Brief Operative Note    Pre-operative diagnosis: Abnormal uterine bleeding [N93.9]  Fibroids, submucosal [D25.0]  Menorrhagia [N92.0]  Post-operative diagnosis Same as pre-operative diagnosis    Procedure: Procedure(s):  HYSTEROSCOPY DILATION AND CURETTAGE WITH MYOSURE  INSERTION, INTRAUTERINE DEVICE, MIRENA  Surgeon: Surgeon(s) and Role:     * Camelia Darby MD - Primary  Anesthesia: General   Estimated blood loss: Less than 10 ml  Drains: None  Specimens:   ID Type Source Tests Collected by Time Destination   A : ENDOMETRIAL CURRETTINGS Tissue Endometrium SURGICAL PATHOLOGY EXAM Camelia Darby MD 12/12/2019  9:53 AM      Findings:   large amount of cystic fragile tissue with atypical looking vessels in cavity, some polyps and possible submucous fibroid.  Complications: None.  Very atypical appearance of endometrial lining in multiple areas   Sounded 10 cm  IUD inserted without complications  anteverted

## 2019-12-12 NOTE — DISCHARGE INSTRUCTIONS
Today you were given 975 mg of Tylenol at 0745 The recommended daily maximum dose is 4000 mg.     Same Day Surgery Discharge Instructions for  Sedation and General Anesthesia       It's not unusual to feel dizzy, light-headed or faint for up to 24 hours after surgery or while taking pain medication.  If you have these symptoms: sit for a few minutes before standing and have someone assist you when you get up to walk or use the bathroom.      You should rest and relax for the next 24 hours. We recommend you make arrangements to have an adult stay with you for at least 24 hours after your discharge.  Avoid hazardous and strenuous activity.      DO NOT DRIVE any vehicle or operate mechanical equipment for 24 hours following the end of your surgery.  Even though you may feel normal, your reactions may be affected by the medication you have received.      Do not drink alcoholic beverages for 24 hours following surgery.       Slowly progress to your regular diet as you feel able. It's not unusual to feel nauseated and/or vomit after receiving anesthesia.  If you develop these symptoms, drink clear liquids (apple juice, ginger ale, broth, 7-up, etc. ) until you feel better.  If your nausea and vomiting persists for 24 hours, please notify your surgeon.        All narcotic pain medications, along with inactivity and anesthesia, can cause constipation. Drinking plenty of liquids and increasing fiber intake will help.      For any questions of a medical nature, call your surgeon.      Do not make important decisions for 24 hours.      If you had general anesthesia, you may have a sore throat for a couple of days related to the breathing tube used during surgery.  You may use Cepacol lozenges to help with this discomfort.  If it worsens or if you develop a fever, contact your surgeon.       If you feel your pain is not well managed with the pain medications prescribed by your surgeon, please contact your surgeon's office to  let them know so they can address your concerns.     HOME CARE FOLLOWING HYSTEROSCOPY    Diet  You have no restrictions on your diet.  During the evening following surgery, drink plenty of fluids and eat a light supper.    Nausea  The anesthesia may produce some nausea.  If you feel nauseated, stay in bed and try drinking fluids such as 7-Up, tea, or soup.    Discomfort  The amount of discomfort you can expect is very unpredictable.  If you have pain that cannot be controlled with Tylenol or with the prescription you may have received, you should notify your physician.  Abdominal cramping or low backache is not uncommon and should not be a cause for concern.  You will be drowsy and weak the day of surgery and possibly the following day.  Fever  A low grade fever (not over 100 F) is usual after this procedure.  Do not hesitate to notify your physician if your fever seems excessive or persists.    Activity   You may resume your normal activity.  Avoid heavy lifting for one week.  You may bathe or shower.  Do not douche, use tampons, or resume intercourse until the bleeding has ceased.    Emergency Care  Contact your physician if you have any of these problems:   1.  A fever over 100 F   2.  A large amount of bleeding or drainage   3.  Severe pain         **If you have questions or concerns about your procedure,   call Dr. Darby at [phone]**

## 2019-12-12 NOTE — OP NOTE
Procedure Date: 12/12/2019      TIME OF SURGERY:  10:20 a.m.      PREOPERATIVE DIAGNOSES:  Abnormal uterine bleeding, submucosal fibroid and menorrhagia.      POSTOPERATIVE DIAGNOSES:  Abnormal uterine bleeding, submucosal fibroid and menorrhagia.      PROCEDURE:  Hysteroscopy with dilation and curettage and MyoSure, as well as insertion of IUD device. Brand was Mirena.      SURGEON:  Dr. Camelia Darby.      ANESTHESIA:  General.      ESTIMATED BLOOD LOSS:  Less than 10 mL.      FINDINGS:  The patient had a large amount of atypical-looking tissue in the endometrial cavity.  Much of it was cystic in appearance.  There were many atypical looking vessels.  She also had some small polyps and an area that looked more like a submucous myoma.        DESCRIPTION OF PROCEDURE:  The patient was taken to the operating room and placed in supine position.  Anesthesia was administered.  She was placed in lithotomy position.  Her bladder was drained with a catheter.  A timeout procedure had been carried out previously, and then a speculum was inserted in the vagina.  Cervix was grasped with a single-tooth tenaculum.  I sounded the uterine cavity.  It sounded to 10 cm.  It was anteverted.  The cervix was patulous.  It was dilated slightly, and we were easily able to insert a hysteroscope.  Once we did that, we encountered the findings as described above.  Extensive amount of very atypical-appearing tissue was seen in the endometrial cavity during the case.  The MyoSure was inserted, and we carefully removed all the visible irregular tissue on the walls, the polyps and shaved the entire surface of the cavity.  All of that tissue was sent for pathology.  The cavity at that point looked relatively clean, although there was still some light bleeding in areas. We sounded the uterus again to 10 cm. Mirena IUD was inserted without difficulty. We cut the iud string to 3cm .  We removed the instruments.  She had continued bleeding from the  tenaculum site, so we used a silver nitrate stick on that and some compression, and the bleeding stopped.  She had a general anesthetic for the entire case.   All the instruments were removed, and the patient went to the recovery room in stable condition.         CUBA BUNCH MD             D: 2019   T: 2019   MT:       Name:     NEFTALI VO   MRN:      [id number]        Account:        [id number]   :      1980           Procedure Date: 2019      Document: [id number]

## 2019-12-12 NOTE — ANESTHESIA POSTPROCEDURE EVALUATION
Patient: Puma Trent    Procedure(s):  HYSTEROSCOPY DILATION AND CURETTAGE WITH MYOSURE  INSERTION, INTRAUTERINE DEVICE, MIRENA    Diagnosis:Abnormal uterine bleeding [N93.9]  Fibroids, submucosal [D25.0]  Menorrhagia [N92.0]  Diagnosis Additional Information: No value filed.    Anesthesia Type:  ETT, General    Note:  Anesthesia Post Evaluation    Patient location during evaluation: PACU  Patient participation: Able to fully participate in evaluation  Level of consciousness: awake and alert  Pain management: adequate  Airway patency: patent  Cardiovascular status: acceptable and hemodynamically stable  Respiratory status: acceptable  Hydration status: euvolemic  PONV: none     Anesthetic complications: None          Last vitals:  Vitals:    12/12/19 1100 12/12/19 1115 12/12/19 1220   BP: 108/70 116/73 113/60   Pulse: 92 92    Resp: 16 16    Temp:      SpO2: 96% 93% 95%         Electronically Signed By: Sterling Choi MD  December 12, 2019  1:47 PM

## 2019-12-12 NOTE — ANESTHESIA CARE TRANSFER NOTE
Patient: Puma Trent    Procedure(s):  HYSTEROSCOPY DILATION AND CURETTAGE WITH MYOSURE  INSERTION, INTRAUTERINE DEVICE, MIRENA    Diagnosis: Abnormal uterine bleeding [N93.9]  Fibroids, submucosal [D25.0]  Menorrhagia [N92.0]  Diagnosis Additional Information: No value filed.    Anesthesia Type:   ETT, General     Note:  Airway :Face Mask  Patient transferred to:PACU  Comments: Neuromuscular blockade not used after succinylcholine for intubation, spontaneous return of TOF 4/4 with sustained tetany, spontaneous respirations, adequate tidal volumes, followed commands to voice, extubated atraumatically, extubated with suction, airway patent after extubation.  Oxygen via facemask at 8 liters per minute to PACU. Oxygen tubing connected to wall O2 in PACU, SpO2, NiBP, and EKG monitors and alarms on and functioning, Deondre Hugger warmer connected to patient gown, Report to RN, questions answered. . Handoff Report: Identifed the Patient, Identified the Reponsible Provider, Reviewed the pertinent medical history, Discussed the surgical course, Reviewed Intra-OP anesthesia mangement and issues during anesthesia, Set expectations for post-procedure period and Allowed opportunity for questions and acknowledgement of understanding      Vitals: (Last set prior to Anesthesia Care Transfer)    CRNA VITALS  12/12/2019 0930 - 12/12/2019 1006      12/12/2019             Pulse:  104    SpO2:  97 %    Resp Rate (set):  10                Electronically Signed By: JOSÉ Braga CRNA  December 12, 2019  10:06 AM

## 2019-12-13 ENCOUNTER — TELEPHONE (OUTPATIENT)
Dept: OBGYN | Facility: CLINIC | Age: 39
End: 2019-12-13

## 2019-12-13 DIAGNOSIS — N92.1 MENORRHAGIA WITH IRREGULAR CYCLE: Primary | ICD-10-CM

## 2019-12-13 LAB — COPATH REPORT: NORMAL

## 2019-12-13 NOTE — TELEPHONE ENCOUNTER
I called  to let him know that path results were benign despite the visual appearance at surgery.     Also sent out my chart note with results for patient.     I still want to see patient and  in clinic on Tues for their post op appt

## 2019-12-18 NOTE — PROGRESS NOTES
SUBJECTIVE:                                                   Puma Trent is a 39 year old female who presents to clinic today for the following health issue(s):  Patient presents with:  Surgical Followup: f/u to hysteroscopy d&c      HPI:  Patient isn't having any bleeding other than a bit of spotting  Recent hysteroscopy had very thick tissue removed but path was ok  Mirena IUD placed in or  No family history of gyn cancers    Cavity was clear at completion of her hysteroscopy.  She had an extreme thick lining at time of surgery but path was benign without hyperplasia or uterine cancer.  She has irregular breakdown due to erratic hormone levels from pcos/morbid obesity.  Progestin IUD placed in operating room.  We are hoping to prevent recurrence of her bleeding issues with progestin.     Hysterectomy would be very difficult due to her body habitus.       Patient's last menstrual period was 2019 (exact date)..     Patient is sexually active, .  Using none for contraception.    reports that she has never smoked. She has never used smokeless tobacco.    STD testing offered?  Declined    Health maintenance updated:  yes    Problem list and histories reviewed & adjusted, as indicated.  Additional history: as documented.    Patient Active Problem List   Diagnosis     BMI 45.0-49.9, adult (H)     Abnormal uterine bleeding     Fibroids, submucosal     Menorrhagia     Past Surgical History:   Procedure Laterality Date      SECTION  2008     DILATION AND CURETTAGE, OPERATIVE HYSTEROSCOPY WITH MORCELLATOR, COMBINED N/A 2019    Procedure: HYSTEROSCOPY DILATION AND CURETTAGE WITH MYOSURE;  Surgeon: Camelia Darby MD;  Location:  OR     HEAD & NECK SURGERY  Tonsils removal ()     INSERT INTRAUTERINE DEVICE Bilateral 2019    Procedure: INSERTION, INTRAUTERINE DEVICE, MIRENA;  Surgeon: Camelia Darby MD;  Location:  OR     LAPAROSCOPIC CYSTECTOMY OVARIAN (BENIGN)   "11/2013      Social History     Tobacco Use     Smoking status: Never Smoker     Smokeless tobacco: Never Used   Substance Use Topics     Alcohol use: Never     Frequency: Never      Problem (# of Occurrences) Relation (Name,Age of Onset)    Hyperlipidemia (1) Mother    Hypertension (1) Mother    No Known Problems (7) Father, Sister, Brother, Maternal Grandmother, Maternal Grandfather, Paternal Grandmother, Other            No current outpatient medications on file.     No current facility-administered medications for this visit.      Allergies   Allergen Reactions     Sulfa Drugs Hives     Fainting and drop in blood pressure        ROS:  12 point review of systems negative other than symptoms noted below or in the HPI.  Genitourinary: Spotting  No urinary frequency or dysuria, bladder or kidney problems      OBJECTIVE:     /60   Pulse 92   Ht 1.626 m (5' 4\")   Wt 126.1 kg (278 lb)   LMP 11/30/2019 (Exact Date)   BMI 47.72 kg/m    Body mass index is 47.72 kg/m .    Exam:  Constitutional:  Appearance: Well nourished, well developed alert, in no acute distress  Chest:  Respiratory Effort:  Breathing unlabored. Clear to auscultation bilaterally.   Psychiatric:  Mentation appears normal and affect normal/bright.     In-Clinic Test Results:  No results found for this or any previous visit (from the past 24 hour(s)).    ASSESSMENT/PLAN:                                                    We are relieved that her path didn't show endometrial hyperplasia which I thought might be found based on how the cavity looked at time of surgery.  She had extensive lining thickening and we shaved the entire cavity with myosure to remove all of it for pathology check.  Mirena IUD placed in surgery.    Hoping the progestin IUD will suppress her uterine lining enough to keep it from getting overly thick again.   It can take months for the IUD to get good control of bleeding so she needs to be patient. I am hopefull by 6 months " out that she won't have heavy bleeding episodes. See me in 6 months    We discussed her surgical findings and path report in detail.  No neoplasia was found.     Her weight is a major contributor to the hormone issues she has that leads to abnormal uterine bleeding.   Long discussion that it is important for her to start seriously addressing need to lose a lot of weight.   Rec she discuss with her pcp referral to medical weight loss clinic. I am very concerned about her obesity level at this age.     She is at high risk of developing metabolic syndrome with progression to diabetes, fatty liver, heart disease and continued risk of developing endometrial hyperplasia or cancer from excessive estrogen in her system.  Patient may eventually consider bariatric surgery if less aggressive modalities don't help.     We discussed proper portion sizes.  She admits to eating very large amounts of rice every day, in the range of 4-5 cups.     Will have her consult further with her pcp to discuss follow up on best place to work with to help her lose weight.       Camelia Darby MD  [facility] [place]

## 2019-12-26 ENCOUNTER — OFFICE VISIT (OUTPATIENT)
Dept: OBGYN | Facility: CLINIC | Age: 39
End: 2019-12-26
Payer: COMMERCIAL

## 2019-12-26 VITALS
DIASTOLIC BLOOD PRESSURE: 60 MMHG | BODY MASS INDEX: 47.46 KG/M2 | HEART RATE: 92 BPM | HEIGHT: 64 IN | WEIGHT: 278 LBS | SYSTOLIC BLOOD PRESSURE: 113 MMHG

## 2019-12-26 DIAGNOSIS — Z09 POSTOP CHECK: ICD-10-CM

## 2019-12-26 DIAGNOSIS — N93.8 DUB (DYSFUNCTIONAL UTERINE BLEEDING): Primary | ICD-10-CM

## 2019-12-26 PROCEDURE — 99212 OFFICE O/P EST SF 10 MIN: CPT | Performed by: OBSTETRICS & GYNECOLOGY

## 2019-12-26 ASSESSMENT — MIFFLIN-ST. JEOR: SCORE: 1916

## 2019-12-26 NOTE — Clinical Note
I am encouraging patient to talk with you about possible referral to a medical weight loss clinic.  We placed IUD in operating room hoping it would reduce her heavy bleeding episodes. Camelia Darby MD

## 2019-12-30 NOTE — PROGRESS NOTES
Patient was advised to talk with me about weight loss management referral. She mentioned this when here at a visit with her daughter last week.  Would like a referral to bariatric/weight management program. Order placed.     Sharla Bustillo MD

## 2020-03-11 ENCOUNTER — HEALTH MAINTENANCE LETTER (OUTPATIENT)
Age: 40
End: 2020-03-11

## 2020-05-04 ENCOUNTER — MYC MEDICAL ADVICE (OUTPATIENT)
Dept: OBGYN | Facility: CLINIC | Age: 40
End: 2020-05-04

## 2020-05-04 ENCOUNTER — E-VISIT (OUTPATIENT)
Dept: OBGYN | Facility: CLINIC | Age: 40
End: 2020-05-04
Payer: COMMERCIAL

## 2020-05-04 DIAGNOSIS — N89.8 VAGINAL DISCHARGE: Primary | ICD-10-CM

## 2020-05-04 PROCEDURE — 99207 ZZC NO BILLABLE SERVICE THIS VISIT: CPT | Performed by: OBSTETRICS & GYNECOLOGY

## 2020-05-04 NOTE — TELEPHONE ENCOUNTER
Provider E-Visit time total (minutes): I'm sorry  Vincenzoadhair  You will need to be seen in office for this.   I can't tell what is causing your symptoms without a lab test and exam  Call office and make appt    Camelia Darby MD

## 2020-05-04 NOTE — TELEPHONE ENCOUNTER
Dr Darby feels this pt needs to be seen in office for evaluation. Sent message to pt via [employer].    Tita Nj RN on 5/4/2020 at 3:53 PM

## 2020-05-05 ENCOUNTER — MYC MEDICAL ADVICE (OUTPATIENT)
Dept: OBGYN | Facility: CLINIC | Age: 40
End: 2020-05-05

## 2020-05-05 NOTE — TELEPHONE ENCOUNTER
She can try over the counter cortisone cream bid.    Don't know what we are treating so can't really choose a specific therapy.

## 2020-05-05 NOTE — TELEPHONE ENCOUNTER
Routing pt mychart message to provider to advise.  Estephanie Villalobos RN on 5/5/2020 at 12:23 PM

## 2020-05-12 NOTE — PROGRESS NOTES
SUBJECTIVE:                                                   Puma Trent is a 39 year old female who presents to clinic today for the following health issue(s):  Patient presents with:  Follow Up: patient had Hyst D&C Myosure with Mirena placed in December. Monthly she has been having a watery discharge at the time of her cycle. This past month the discharge lasted longer. Today she is having itching. She tried hydrocortisone spray which burned. The pharmacy suggested Monistat spray which helped for a short time before symptoms returned.      HPI:  Patient has a mirena iud in place  Had hysterescopy in dec and path was benign   mirena iud placed in OR  Now has only tiny lt periods but has chronic watery discharge so wearing daily liners  Has never used tampons  Has developed vulvar itching externally from the pads  No internal vaginal itching      No LMP recorded..   Patient is sexually active, .  Using nothing for contraception.    reports that she has never smoked. She has never used smokeless tobacco.  STD testing offered?  Declined  Health maintenance updated:  yes    Today's PHQ-2 Score:   PHQ-2 (  [employer]) 10/14/2019   Q1: Little interest or pleasure in doing things 0   Q2: Feeling down, depressed or hopeless 0   PHQ-2 Score 0     Today's PHQ-9 Score:   PHQ-9 SCORE 10/22/2019   PHQ-9 Total Score 5     Today's LINN-7 Score:   LINN-7 SCORE 10/22/2019   Total Score 3       Problem list and histories reviewed & adjusted, as indicated.  Additional history: as documented.    Patient Active Problem List   Diagnosis     BMI 45.0-49.9, adult (H)     Abnormal uterine bleeding     Fibroids, submucosal     Menorrhagia     Past Surgical History:   Procedure Laterality Date      SECTION  2008     DILATION AND CURETTAGE, OPERATIVE HYSTEROSCOPY WITH MORCELLATOR, COMBINED N/A 2019    Procedure: HYSTEROSCOPY DILATION AND CURETTAGE WITH MYOSURE;  Surgeon: Camelia Darby MD;  Location:  OR  "    HEAD & NECK SURGERY  Tonsils removal (1990)     INSERT INTRAUTERINE DEVICE Bilateral 12/12/2019    Procedure: INSERTION, INTRAUTERINE DEVICE, MIRENA;  Surgeon: Camelia Darby MD;  Location: [facility] OR     LAPAROSCOPIC CYSTECTOMY OVARIAN (BENIGN)  11/2013      Social History     Tobacco Use     Smoking status: Never Smoker     Smokeless tobacco: Never Used   Substance Use Topics     Alcohol use: Never     Frequency: Never      Problem (# of Occurrences) Relation (Name,Age of Onset)    Hyperlipidemia (1) Mother    Hypertension (1) Mother    No Known Problems (7) Father, Sister, Brother, Maternal Grandmother, Maternal Grandfather, Paternal Grandmother, Other            Current Outpatient Medications   Medication Sig     levonorgestrel (MIRENA) 20 MCG/24HR IUD 1 each by Intrauterine route once     No current facility-administered medications for this visit.      Allergies   Allergen Reactions     Sulfa Drugs Hives     Fainting and drop in blood pressure        ROS:  12 point review of systems negative other than symptoms noted below or in the HPI.  Genitourinary: Vaginal Discharge and Vaginal Itching  No urinary frequency or dysuria, bladder or kidney problems      OBJECTIVE:     /70   Ht 1.626 m (5' 4\")   Wt 127.5 kg (281 lb)   BMI 48.23 kg/m    Body mass index is 48.23 kg/m .    Exam:  Constitutional:  Appearance: Well nourished, well developed alert, in no acute distress  Chest:  Respiratory Effort:  Breathing unlabored. Clear to auscultation bilaterally.   Psychiatric:  Mentation appears normal and affect normal/bright.  Pelvic Exam:  External Genitalia:     Normal appearance for age, no discharge present, no tenderness present, no inflammatory lesions present, color normal  Vagina:    Normal vaginal vault without central or paravaginal defects, no discharge present, no inflammatory lesions present, no masses present  Bladder:     Nontender to palpation  Urethra:   Urethral Body:  Urethra palpation " normal, urethra structural support normal   Urethral Meatus:  No erythema or lesions present  Cervix:     Appearance healthy, no lesions present, nontender to palpation, no bleeding present, string present  Uterus:     Nontender to palpation, no masses present, position anteflexed, mobility: normal  Adnexa:     No adnexal tenderness present, no adnexal masses present  Perineum:     Perineum within normal limits, no evidence of trauma, no rashes or skin lesions present  Anus:     Anus within normal limits, no hemorrhoids present  Inguinal Lymph Nodes:     No lymphadenopathy present  Pubic Hair:     Normal pubic hair distribution for age  Genitalia and Groin:     No rashes present, no lesions present, no areas of discoloration, no masses present       In-Clinic Test Results:  Results for orders placed or performed in visit on 05/13/20 (from the past 24 hour(s))   Wet prep    Specimen: Vagina   Result Value Ref Range    Specimen Description Vagina     Wet Prep No Trichomonas seen     Wet Prep No clue cells seen     Wet Prep No yeast seen     Wet Prep WBC'S seen  Rare          ASSESSMENT/PLAN:                                                        ICD-10-CM    1. Vaginal discharge  N89.8 Wet prep     Gram stain       There are no Patient Instructions on file for this visit.    iud string seen  I think the vulvar itching is from the daily pad use  I don't see much discharge in vagina  iud has corrected the bleeding issues  And will be protective on her endometrial lining since at risk for hyperplasia with her morbid obesity and pcos    Wet smear negative  Gram stain pending  Will try tampons instead of pads  Topical cortisone cream not spray  See me 6 months  Face to face 25 minute, greater than 50% counseling    Camelia Darby MD  [facility] [place]

## 2020-05-13 ENCOUNTER — OFFICE VISIT (OUTPATIENT)
Dept: OBGYN | Facility: CLINIC | Age: 40
End: 2020-05-13
Payer: COMMERCIAL

## 2020-05-13 VITALS
BODY MASS INDEX: 47.97 KG/M2 | DIASTOLIC BLOOD PRESSURE: 70 MMHG | WEIGHT: 281 LBS | SYSTOLIC BLOOD PRESSURE: 110 MMHG | HEIGHT: 64 IN

## 2020-05-13 DIAGNOSIS — N89.8 VAGINAL DISCHARGE: Primary | ICD-10-CM

## 2020-05-13 LAB
SPECIMEN SOURCE: NORMAL
WET PREP SPEC: NORMAL

## 2020-05-13 PROCEDURE — 99214 OFFICE O/P EST MOD 30 MIN: CPT | Performed by: OBSTETRICS & GYNECOLOGY

## 2020-05-13 PROCEDURE — 87205 SMEAR GRAM STAIN: CPT | Performed by: OBSTETRICS & GYNECOLOGY

## 2020-05-13 PROCEDURE — 87210 SMEAR WET MOUNT SALINE/INK: CPT | Performed by: OBSTETRICS & GYNECOLOGY

## 2020-05-13 ASSESSMENT — MIFFLIN-ST. JEOR: SCORE: 1929.61

## 2020-05-14 LAB
GRAM STN SPEC: NORMAL
Lab: NORMAL
SPECIMEN SOURCE: NORMAL

## 2021-01-03 ENCOUNTER — HEALTH MAINTENANCE LETTER (OUTPATIENT)
Age: 41
End: 2021-01-03

## 2021-04-25 ENCOUNTER — HEALTH MAINTENANCE LETTER (OUTPATIENT)
Age: 41
End: 2021-04-25

## 2021-10-10 ENCOUNTER — HEALTH MAINTENANCE LETTER (OUTPATIENT)
Age: 41
End: 2021-10-10

## 2022-05-21 ENCOUNTER — HEALTH MAINTENANCE LETTER (OUTPATIENT)
Age: 42
End: 2022-05-21

## 2022-09-18 ENCOUNTER — HEALTH MAINTENANCE LETTER (OUTPATIENT)
Age: 42
End: 2022-09-18

## 2023-06-04 ENCOUNTER — HEALTH MAINTENANCE LETTER (OUTPATIENT)
Age: 43
End: 2023-06-04

## 2024-02-25 ENCOUNTER — HEALTH MAINTENANCE LETTER (OUTPATIENT)
Age: 44
End: 2024-02-25

## (undated) DEVICE — PACK TVT HYSTEROSCOPY SMA15HYFSE

## (undated) DEVICE — SOL WATER IRRIG 1000ML BOTTLE 2F7114

## (undated) DEVICE — SUCTION CANISTER BEMIS HI FLOW 006772-901

## (undated) DEVICE — LINEN TOWEL PACK X5 5464

## (undated) DEVICE — SOL NACL 0.9% IRRIG 1000ML BOTTLE 2F7124

## (undated) DEVICE — GLOVE PROTEXIS W/NEU-THERA 6.0  2D73TE60

## (undated) DEVICE — SEAL SET MYOSURE ROD LENS SCOPE SINGLE USE 40-902

## (undated) DEVICE — SOL NACL 0.9% IRRIG 3000ML BAG 2B7477

## (undated) RX ORDER — PROPOFOL 10 MG/ML
INJECTION, EMULSION INTRAVENOUS
Status: DISPENSED
Start: 2019-12-12

## (undated) RX ORDER — ONDANSETRON 2 MG/ML
INJECTION INTRAMUSCULAR; INTRAVENOUS
Status: DISPENSED
Start: 2019-12-12

## (undated) RX ORDER — HYDROXYZINE HYDROCHLORIDE 50 MG/1
TABLET, FILM COATED ORAL
Status: DISPENSED
Start: 2019-12-12

## (undated) RX ORDER — ACETAMINOPHEN 325 MG/1
TABLET ORAL
Status: DISPENSED
Start: 2019-12-12

## (undated) RX ORDER — FENTANYL CITRATE 50 UG/ML
INJECTION, SOLUTION INTRAMUSCULAR; INTRAVENOUS
Status: DISPENSED
Start: 2019-12-12

## (undated) RX ORDER — BUPIVACAINE HYDROCHLORIDE 2.5 MG/ML
INJECTION, SOLUTION EPIDURAL; INFILTRATION; INTRACAUDAL
Status: DISPENSED
Start: 2019-12-12

## (undated) RX ORDER — DEXAMETHASONE SODIUM PHOSPHATE 4 MG/ML
INJECTION, SOLUTION INTRA-ARTICULAR; INTRALESIONAL; INTRAMUSCULAR; INTRAVENOUS; SOFT TISSUE
Status: DISPENSED
Start: 2019-12-12